# Patient Record
Sex: MALE | Race: WHITE | NOT HISPANIC OR LATINO
[De-identification: names, ages, dates, MRNs, and addresses within clinical notes are randomized per-mention and may not be internally consistent; named-entity substitution may affect disease eponyms.]

---

## 2017-03-06 ENCOUNTER — APPOINTMENT (OUTPATIENT)
Dept: OTOLARYNGOLOGY | Facility: CLINIC | Age: 22
End: 2017-03-06

## 2017-03-29 ENCOUNTER — APPOINTMENT (OUTPATIENT)
Dept: OTOLARYNGOLOGY | Facility: CLINIC | Age: 22
End: 2017-03-29

## 2017-03-29 VITALS
BODY MASS INDEX: 21.5 KG/M2 | WEIGHT: 137 LBS | SYSTOLIC BLOOD PRESSURE: 120 MMHG | HEIGHT: 67 IN | HEART RATE: 76 BPM | DIASTOLIC BLOOD PRESSURE: 77 MMHG

## 2017-03-29 DIAGNOSIS — H61.20 IMPACTED CERUMEN, UNSPECIFIED EAR: ICD-10-CM

## 2017-03-29 DIAGNOSIS — Z83.3 FAMILY HISTORY OF DIABETES MELLITUS: ICD-10-CM

## 2017-03-29 RX ORDER — METHYLPHENIDATE HYDROCHLORIDE 10 MG/1
10 TABLET ORAL
Qty: 90 | Refills: 0 | Status: ACTIVE | COMMUNITY
Start: 2017-01-10

## 2017-03-29 RX ORDER — FLUOXETINE HYDROCHLORIDE 20 MG/1
20 CAPSULE ORAL
Qty: 60 | Refills: 0 | Status: ACTIVE | COMMUNITY
Start: 2016-05-09

## 2017-05-24 ENCOUNTER — MEDICATION RENEWAL (OUTPATIENT)
Age: 22
End: 2017-05-24

## 2017-05-24 RX ORDER — ASPIRIN 81 MG
6.5 TABLET, DELAYED RELEASE (ENTERIC COATED) ORAL TWICE DAILY
Qty: 1 | Refills: 5 | Status: ACTIVE | COMMUNITY
Start: 2017-03-29 | End: 1900-01-01

## 2017-06-21 ENCOUNTER — APPOINTMENT (OUTPATIENT)
Dept: OTOLARYNGOLOGY | Facility: CLINIC | Age: 22
End: 2017-06-21

## 2017-10-02 ENCOUNTER — TRANSCRIPTION ENCOUNTER (OUTPATIENT)
Age: 22
End: 2017-10-02

## 2017-10-12 ENCOUNTER — APPOINTMENT (OUTPATIENT)
Dept: OTOLARYNGOLOGY | Facility: CLINIC | Age: 22
End: 2017-10-12
Payer: MEDICAID

## 2017-10-12 VITALS
HEIGHT: 67 IN | HEART RATE: 62 BPM | BODY MASS INDEX: 21.5 KG/M2 | WEIGHT: 137 LBS | DIASTOLIC BLOOD PRESSURE: 69 MMHG | SYSTOLIC BLOOD PRESSURE: 105 MMHG

## 2017-10-12 DIAGNOSIS — R04.0 EPISTAXIS: ICD-10-CM

## 2017-10-12 DIAGNOSIS — J34.2 DEVIATED NASAL SEPTUM: ICD-10-CM

## 2017-10-12 DIAGNOSIS — H61.23 IMPACTED CERUMEN, BILATERAL: ICD-10-CM

## 2017-10-12 DIAGNOSIS — S09.92XA UNSPECIFIED INJURY OF NOSE, INITIAL ENCOUNTER: ICD-10-CM

## 2017-10-12 DIAGNOSIS — H90.0 CONDUCTIVE HEARING LOSS, BILATERAL: ICD-10-CM

## 2017-10-12 PROCEDURE — 69210 REMOVE IMPACTED EAR WAX UNI: CPT

## 2017-10-12 PROCEDURE — 31231 NASAL ENDOSCOPY DX: CPT | Mod: 52

## 2017-10-12 PROCEDURE — 99214 OFFICE O/P EST MOD 30 MIN: CPT | Mod: 25

## 2022-06-01 ENCOUNTER — INPATIENT (INPATIENT)
Facility: HOSPITAL | Age: 27
LOS: 7 days | Discharge: HOME | End: 2022-06-09
Attending: PSYCHIATRY & NEUROLOGY | Admitting: STUDENT IN AN ORGANIZED HEALTH CARE EDUCATION/TRAINING PROGRAM
Payer: MEDICAID

## 2022-06-01 VITALS
DIASTOLIC BLOOD PRESSURE: 74 MMHG | OXYGEN SATURATION: 98 % | HEIGHT: 60 IN | HEART RATE: 75 BPM | TEMPERATURE: 98 F | SYSTOLIC BLOOD PRESSURE: 130 MMHG | RESPIRATION RATE: 18 BRPM

## 2022-06-01 DIAGNOSIS — F90.9 ATTENTION-DEFICIT HYPERACTIVITY DISORDER, UNSPECIFIED TYPE: ICD-10-CM

## 2022-06-01 DIAGNOSIS — F32.A DEPRESSION, UNSPECIFIED: ICD-10-CM

## 2022-06-01 LAB
ALBUMIN SERPL ELPH-MCNC: 5 G/DL — SIGNIFICANT CHANGE UP (ref 3.3–5)
ALP SERPL-CCNC: 115 U/L — SIGNIFICANT CHANGE UP (ref 40–120)
ALT FLD-CCNC: 19 U/L — SIGNIFICANT CHANGE UP (ref 4–41)
AMPHET UR-MCNC: NEGATIVE — SIGNIFICANT CHANGE UP
ANION GAP SERPL CALC-SCNC: 18 MMOL/L — HIGH (ref 7–14)
APAP SERPL-MCNC: <10 UG/ML — LOW (ref 15–25)
APPEARANCE UR: CLEAR — SIGNIFICANT CHANGE UP
AST SERPL-CCNC: 22 U/L — SIGNIFICANT CHANGE UP (ref 4–40)
BACTERIA # UR AUTO: NEGATIVE — SIGNIFICANT CHANGE UP
BARBITURATES UR SCN-MCNC: NEGATIVE — SIGNIFICANT CHANGE UP
BASE EXCESS BLDV CALC-SCNC: -1.2 MMOL/L — SIGNIFICANT CHANGE UP (ref -2–3)
BASOPHILS # BLD AUTO: 0.03 K/UL — SIGNIFICANT CHANGE UP (ref 0–0.2)
BASOPHILS NFR BLD AUTO: 0.3 % — SIGNIFICANT CHANGE UP (ref 0–2)
BENZODIAZ UR-MCNC: NEGATIVE — SIGNIFICANT CHANGE UP
BILIRUB SERPL-MCNC: 0.5 MG/DL — SIGNIFICANT CHANGE UP (ref 0.2–1.2)
BILIRUB UR-MCNC: ABNORMAL
BLOOD GAS VENOUS COMPREHENSIVE RESULT: SIGNIFICANT CHANGE UP
BUN SERPL-MCNC: 14 MG/DL — SIGNIFICANT CHANGE UP (ref 7–23)
CALCIUM SERPL-MCNC: 10 MG/DL — SIGNIFICANT CHANGE UP (ref 8.4–10.5)
CHLORIDE BLDV-SCNC: 101 MMOL/L — SIGNIFICANT CHANGE UP (ref 96–108)
CHLORIDE SERPL-SCNC: 100 MMOL/L — SIGNIFICANT CHANGE UP (ref 98–107)
CO2 BLDV-SCNC: 24.8 MMOL/L — SIGNIFICANT CHANGE UP (ref 22–26)
CO2 SERPL-SCNC: 19 MMOL/L — LOW (ref 22–31)
COCAINE METAB.OTHER UR-MCNC: NEGATIVE — SIGNIFICANT CHANGE UP
COLOR SPEC: YELLOW — SIGNIFICANT CHANGE UP
CREAT SERPL-MCNC: 0.9 MG/DL — SIGNIFICANT CHANGE UP (ref 0.5–1.3)
CREATININE URINE RESULT, DAU: 447 MG/DL — SIGNIFICANT CHANGE UP
DIFF PNL FLD: ABNORMAL
EGFR: 122 ML/MIN/1.73M2 — SIGNIFICANT CHANGE UP
EOSINOPHIL # BLD AUTO: 0.06 K/UL — SIGNIFICANT CHANGE UP (ref 0–0.5)
EOSINOPHIL NFR BLD AUTO: 0.6 % — SIGNIFICANT CHANGE UP (ref 0–6)
EPI CELLS # UR: 1 /HPF — SIGNIFICANT CHANGE UP (ref 0–5)
ETHANOL SERPL-MCNC: <10 MG/DL — SIGNIFICANT CHANGE UP
FLUAV AG NPH QL: SIGNIFICANT CHANGE UP
FLUBV AG NPH QL: SIGNIFICANT CHANGE UP
GAS PNL BLDV: 132 MMOL/L — LOW (ref 136–145)
GAS PNL BLDV: SIGNIFICANT CHANGE UP
GLUCOSE BLDV-MCNC: 98 MG/DL — SIGNIFICANT CHANGE UP (ref 70–99)
GLUCOSE SERPL-MCNC: 96 MG/DL — SIGNIFICANT CHANGE UP (ref 70–99)
GLUCOSE UR QL: NEGATIVE — SIGNIFICANT CHANGE UP
HCO3 BLDV-SCNC: 24 MMOL/L — SIGNIFICANT CHANGE UP (ref 22–29)
HCT VFR BLD CALC: 48.6 % — SIGNIFICANT CHANGE UP (ref 39–50)
HCT VFR BLDA CALC: 50 % — SIGNIFICANT CHANGE UP (ref 39–51)
HGB BLD CALC-MCNC: 16.8 G/DL — SIGNIFICANT CHANGE UP (ref 13–17)
HGB BLD-MCNC: 16.2 G/DL — SIGNIFICANT CHANGE UP (ref 13–17)
HYALINE CASTS # UR AUTO: 2 /LPF — SIGNIFICANT CHANGE UP (ref 0–7)
IANC: 6.37 K/UL — SIGNIFICANT CHANGE UP (ref 1.8–7.4)
IMM GRANULOCYTES NFR BLD AUTO: 0.2 % — SIGNIFICANT CHANGE UP (ref 0–1.5)
KETONES UR-MCNC: ABNORMAL
LACTATE BLDV-MCNC: 1.8 MMOL/L — SIGNIFICANT CHANGE UP (ref 0.5–2)
LEUKOCYTE ESTERASE UR-ACNC: NEGATIVE — SIGNIFICANT CHANGE UP
LYMPHOCYTES # BLD AUTO: 2.58 K/UL — SIGNIFICANT CHANGE UP (ref 1–3.3)
LYMPHOCYTES # BLD AUTO: 26.2 % — SIGNIFICANT CHANGE UP (ref 13–44)
MCHC RBC-ENTMCNC: 27.8 PG — SIGNIFICANT CHANGE UP (ref 27–34)
MCHC RBC-ENTMCNC: 33.3 GM/DL — SIGNIFICANT CHANGE UP (ref 32–36)
MCV RBC AUTO: 83.4 FL — SIGNIFICANT CHANGE UP (ref 80–100)
METHADONE UR-MCNC: NEGATIVE — SIGNIFICANT CHANGE UP
MONOCYTES # BLD AUTO: 0.77 K/UL — SIGNIFICANT CHANGE UP (ref 0–0.9)
MONOCYTES NFR BLD AUTO: 7.8 % — SIGNIFICANT CHANGE UP (ref 2–14)
NEUTROPHILS # BLD AUTO: 6.37 K/UL — SIGNIFICANT CHANGE UP (ref 1.8–7.4)
NEUTROPHILS NFR BLD AUTO: 64.9 % — SIGNIFICANT CHANGE UP (ref 43–77)
NITRITE UR-MCNC: NEGATIVE — SIGNIFICANT CHANGE UP
NRBC # BLD: 0 /100 WBCS — SIGNIFICANT CHANGE UP
NRBC # FLD: 0 K/UL — SIGNIFICANT CHANGE UP
OPIATES UR-MCNC: NEGATIVE — SIGNIFICANT CHANGE UP
OXYCODONE UR-MCNC: NEGATIVE — SIGNIFICANT CHANGE UP
PCO2 BLDV: 39 MMHG — LOW (ref 42–55)
PCP SPEC-MCNC: SIGNIFICANT CHANGE UP
PCP UR-MCNC: NEGATIVE — SIGNIFICANT CHANGE UP
PH BLDV: 7.39 — SIGNIFICANT CHANGE UP (ref 7.32–7.43)
PH UR: 5.5 — SIGNIFICANT CHANGE UP (ref 5–8)
PLATELET # BLD AUTO: 276 K/UL — SIGNIFICANT CHANGE UP (ref 150–400)
PO2 BLDV: 80 MMHG — SIGNIFICANT CHANGE UP
POTASSIUM BLDV-SCNC: 3.3 MMOL/L — LOW (ref 3.5–5.1)
POTASSIUM SERPL-MCNC: 3.4 MMOL/L — LOW (ref 3.5–5.3)
POTASSIUM SERPL-SCNC: 3.4 MMOL/L — LOW (ref 3.5–5.3)
PROT SERPL-MCNC: 7.3 G/DL — SIGNIFICANT CHANGE UP (ref 6–8.3)
PROT UR-MCNC: ABNORMAL
RBC # BLD: 5.83 M/UL — HIGH (ref 4.2–5.8)
RBC # FLD: 13.7 % — SIGNIFICANT CHANGE UP (ref 10.3–14.5)
RBC CASTS # UR COMP ASSIST: 3 /HPF — SIGNIFICANT CHANGE UP (ref 0–4)
RSV RNA NPH QL NAA+NON-PROBE: SIGNIFICANT CHANGE UP
SALICYLATES SERPL-MCNC: <0.3 MG/DL — LOW (ref 15–30)
SAO2 % BLDV: 96.3 % — SIGNIFICANT CHANGE UP
SARS-COV-2 RNA SPEC QL NAA+PROBE: SIGNIFICANT CHANGE UP
SODIUM SERPL-SCNC: 137 MMOL/L — SIGNIFICANT CHANGE UP (ref 135–145)
SP GR SPEC: 1.04 — SIGNIFICANT CHANGE UP (ref 1–1.05)
THC UR QL: POSITIVE
TOXICOLOGY SCREEN, DRUGS OF ABUSE, SERUM RESULT: SIGNIFICANT CHANGE UP
TSH SERPL-MCNC: 0.64 UIU/ML — SIGNIFICANT CHANGE UP (ref 0.27–4.2)
UROBILINOGEN FLD QL: ABNORMAL
WBC # BLD: 9.83 K/UL — SIGNIFICANT CHANGE UP (ref 3.8–10.5)
WBC # FLD AUTO: 9.83 K/UL — SIGNIFICANT CHANGE UP (ref 3.8–10.5)
WBC UR QL: 1 /HPF — SIGNIFICANT CHANGE UP (ref 0–5)

## 2022-06-01 PROCEDURE — 99285 EMERGENCY DEPT VISIT HI MDM: CPT | Mod: 25

## 2022-06-01 PROCEDURE — 93010 ELECTROCARDIOGRAM REPORT: CPT

## 2022-06-01 RX ORDER — POTASSIUM CHLORIDE 20 MEQ
40 PACKET (EA) ORAL ONCE
Refills: 0 | Status: COMPLETED | OUTPATIENT
Start: 2022-06-01 | End: 2022-06-01

## 2022-06-01 RX ORDER — QUETIAPINE FUMARATE 200 MG/1
25 TABLET, FILM COATED ORAL ONCE
Refills: 0 | Status: COMPLETED | OUTPATIENT
Start: 2022-06-01 | End: 2022-06-01

## 2022-06-01 RX ORDER — FLUOXETINE HCL 10 MG
60 CAPSULE ORAL DAILY
Refills: 0 | Status: DISCONTINUED | OUTPATIENT
Start: 2022-06-02 | End: 2022-06-02

## 2022-06-01 RX ADMIN — Medication 40 MILLIEQUIVALENT(S): at 21:07

## 2022-06-01 RX ADMIN — QUETIAPINE FUMARATE 25 MILLIGRAM(S): 200 TABLET, FILM COATED ORAL at 21:44

## 2022-06-01 NOTE — ED BEHAVIORAL HEALTH ASSESSMENT NOTE - CASE SUMMARY
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 Kurtz since 14yo, last hospitalized at [facility] 2/14/2012-2/21/2012, with one prior suicide attempt, no known hx of violence, no sig prior legal hx, uses cannabis occasionally, no hx of withdrawals/DTs, no sig past medical hx, BIB mother/aunt/uncle referred by patient's psychiatrist due to recent behavior concerning for suicide attempt.    Patient presents with several months of grieving his father's death and behavior that endangered his safety (driving in oncoming traffic, taking unprescribed medications.) Though he denied on interview that these behaviors were suicidal in nature, he shared to mother/police/outpatient psychiatrist prior to presentation in ED that he wanted to kill himself. His provided history was inconsistent and illogical. He demonstrates poor insight and poor judgment. Mother reports that they do not feel patient can return to home safely. Patient requires admission for his safety and stabilization.

## 2022-06-01 NOTE — ED BEHAVIORAL HEALTH ASSESSMENT NOTE - DETAILS
d/w mother and Dr. Kurtz Patient expressed on social media and to collateral that he was attempting to kill himself. left message for Dr Rosales of inpatient team

## 2022-06-01 NOTE — ED PROVIDER NOTE - CLINICAL SUMMARY MEDICAL DECISION MAKING FREE TEXT BOX
This is a 26 yr old M, pmh unknown with c/o si attempt, possible od unknown substance. " 4 pills"  He became agitated at triage area wanted to leave. They brought him to , oppositional, angry , does not want to engage in assessment. Staff member was able to redirect him, and he changed.   As per triage rn pt tried to drive into upcoming traffic, after that went home, took 4 unknown pills, and posted on social media " easy ways to commit suicide"  PO fluid hydration , labs, venous blood gas,   psych consult

## 2022-06-01 NOTE — ED BEHAVIORAL HEALTH ASSESSMENT NOTE - SUMMARY
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 Kurtz since 14yo, last hospitalized at [facility] 2/14/2012-2/21/2012, with one prior suicide attempt, no known hx of violence, no sig prior legal hx, uses cannabis occasionally, no hx of withdrawals/DTs, no sig past medical hx, BIB mother/aunt/uncle referred by patient's psychiatrist due to recent behavior concerning for suicide attempt.    Patient presents with several months of grieving his father's death and behavior that endangered his safety (driving in oncoming traffic, taking unprescribed medications.) Though he denied on interview that these behaviors were suicidal in nature, he shared to mother/police/outpatient psychiatrist prior to presentation in ED that he wanted to kill himself. His provided history was inconsistent and illogical. He demonstrates poor insight and poor judgment. Mother reports that they do not feel patient can return to home safely. Patient requires admission for his safety and stabilization.

## 2022-06-01 NOTE — ED BEHAVIORAL HEALTH ASSESSMENT NOTE - DESCRIPTION
Parents are , patient lives with mother. Father  unexpectedly in Dec 2021 at 60yo; patient was close with father. Grandfather ldied in 2021 at 91yo. 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 ICU Vital Signs Last 24 Hrs  T(C): 36.9 (01 Jun 2022 16:56), Max: 36.9 (01 Jun 2022 16:56)  T(F): 98.5 (01 Jun 2022 16:56), Max: 98.5 (01 Jun 2022 16:56)  HR: 75 (01 Jun 2022 16:56) (75 - 75)  BP: 130/74 (01 Jun 2022 16:56) (130/74 - 130/74)  BP(mean): --  ABP: --  ABP(mean): --  RR: 18 (01 Jun 2022 16:56) (18 - 18)  SpO2: 98% (01 Jun 2022 16:56) (98% - 98%) none

## 2022-06-01 NOTE — ED ADULT NURSE NOTE - OBJECTIVE STATEMENT
[facility]ac[facility] RN note- patient arrives to the PSYCH ER after taking four tablets of medication and posting on social media a screen shot searching on [employer] of "easy ways to commit suicide". Per triage note patient reports he tried to turn onto oncoming traffic while driving but patient reports he made that up for attention. Patient denies any HI/SI/AH/VH. Patient cooperative. Labs drawn as ordered. COVID swab sent. Safety maintained. EKG obtained. Patient stable upon exiting the room.

## 2022-06-01 NOTE — ED BEHAVIORAL HEALTH NOTE - BEHAVIORAL HEALTH NOTE
As per request of provider, writer contacted patient’s mother nisha ([phone]) to obtain collateral information. mother requested a call back in 5-10 minutes due to sharing information with registration for the patient. As per request of provider, writer contacted patient’s mother nisha ([phone]) to obtain collateral information. mother requested a call back in 5-10 minutes due to sharing information with registration for the patient. As per request of provider, writer contacted patient’s mother Jeff ([phone]) to obtain collateral information.  The following information is per patient’s mother Jeff.    Patient is a 25 YO male domiciled with mother. Patient BIB mother, aunt and uncle today due two suicide attempts yesterday. As per mother, patient came home with several tickets for driving down on the wrong side of the street in an attempt to hurt himself and also reports to the family that he swallowed 4-7 pills that were at his house from his ex-girlfriend (antibiotics and muscle relaxers). Mother reports patient refused to come to the ED until today. Mother called mobile crisis and they were scheduled to come today as well. Mother has been in touch with patient’s psychiatrist Dr. Kurtz since yesterday. Mother says while the patient was outside, he became resistant and attempted to elope from the hospital. Mother says the patient has a hx of ADHD and depression. Patient is prescribed Ritalin 2x a day 10 mg. Seroquel 50 mg 1x a day and Prozac (doesn’t take as prescribed) 20 mg 4 caps at bedtime. Mother reports patient has appeared more depressed since Saturday. Patient has been sleeping excessively, is lethargic, not eating and hygiene is poor. Mother says the patient works as a  but called out of work since yesterday. Mother reports patient’s father passed away unexpectedly in December 2021. Patient’s grandfather passed away 10 months ago and the grandmother has been in a nursing home for the past 10 weeks.  NO medical problems reported, and patient is covid vaccinated/boosted with booster in 2021. Patient has one prior psych admission for ADHD when he was 13 YO. Mother reports one prior suicide attempt but says it was years ago and could not recall details. Mother believes patient needs psychiatric admission and does not feel safe with patient returning home. Writer agreed to keep the mother updated. As per request of provider, writer contacted patient’s mother Jeff ([phone]) to obtain collateral information.  The following information is per patient’s mother Jeff.    Patient is a 25 YO male domiciled with mother. Patient BIB mother, aunt and uncle today due two suicide attempts yesterday. As per mother, patient came home with several tickets for driving down on the wrong side of the street in an attempt to hurt himself and also reports to the family that he swallowed 4-7 pills that were at his house from his ex-girlfriend (antibiotics and muscle relaxers). Mother reports patient refused to come to the ED until today. Mother called mobile crisis and they were scheduled to come today as well. Mother has been in touch with patient’s psychiatrist Dr. Kurtz since yesterday. Mother says while the patient was outside, he became resistant and attempted to elope from the hospital. Mother says the patient has a hx of ADHD and depression. Patient is prescribed Ritalin 2x a day 10 mg. Seroquel 50 mg 1x a day and Prozac (doesn’t take as prescribed) 20 mg 4 caps at bedtime. Mother reports patient has appeared more depressed since Saturday. Patient has been sleeping excessively, is lethargic, not eating and hygiene is poor. Mother says the patient works as a  but called out of work since yesterday. Mother reports patient’s father passed away unexpectedly in December 2021. Patient’s grandfather passed away 10 months ago and the grandmother has been in a nursing home for the past 10 weeks. MOther reports patient smokes marijuana socially and drinks on occasion.  NO medical problems reported, and patient is covid vaccinated/boosted with booster in 2021. Patient has one prior psych admission for ADHD when he was 15 YO. Mother reports one prior suicide attempt but says it was years ago and could not recall details. Mother believes patient needs psychiatric admission and does not feel safe with patient returning home. Writer agreed to keep the mother updated.

## 2022-06-01 NOTE — ED PROVIDER NOTE - PROGRESS NOTE DETAILS
BLACK Romeo- upon reassessment , pt endorses he takes prozac, seroquel and ritalin. He states couople of days ago he took extra ritalin because wanted to focus better. He states he just lied he did not want to kill himself,  he told that because he wanted more attention.  Yes he drove into upcoming traffic but to go around the cars, not with the intention to die. He currently has one more class and will became and . 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 Sign out follow-up: No acute overnight events. Pt medically clear and boarding for involuntary admission for suicide attempt. LUIS

## 2022-06-01 NOTE — ED BEHAVIORAL HEALTH NOTE - BEHAVIORAL HEALTH NOTE
COVID Exposure Screen- collateral (i.e. third-party, chart review, belongings, etc; include EMS and ED staff)  1.	*Has the patient had a COVID-19 test in the last 90 days?  (  ) Yes   (x  ) No   (  ) Unknown- Reason: _____  IF YES PROCEED TO QUESTION #2. IF NO OR UNKNOWN, PLEASE SKIP TO QUESTION #3.  2.	Date of test(s) and result(s): ________  3.	*Has the patient tested positive for COVID-19 antibodies? (  ) Yes   (  ) No   ( x ) Unknown- Reason: _____  IF YES PROCEED TO QUESTION #4. IF NO or UNKNOWN, PLEASE SKIP TO QUESTION #5.  4.	Date of positive antibody test: ________  5.	*Has the patient received 2 doses of the COVID-19 vaccine? (x  ) Yes   (  ) No   (  ) Unknown- Reason: _____  IF YES PROCEED TO QUESTION #6. IF NO or UNKNOWN, PLEASE SKIP TO QUESTION #7.  6.	 Date of second dose: ________  7.	*In the past 10 days, has the patient been around anyone with a positive COVID-19 test?* (  ) Yes   ( x ) No   (  ) Unknown- Reason: __  IF YES PROCEED TO QUESTION #8. IF NO or UNKNOWN, PLEASE SKIP TO QUESTION #13.  8.	Was the patient within 6 feet of them for at least 15 minutes? (  ) Yes   (  ) No   (  ) Unknown- Reason: _____  9.	Did the patient provide care for them? (  ) Yes   (  ) No   (  ) Unknown- Reason: ______  10.	Did the patient have direct physical contact with them (touched, hugged, or kissed them)? (  ) Yes   (  ) No    (  ) Unknown- Reason: __  11.	Did the patient share eating or drinking utensils with them? (  ) Yes   (  ) No    (  ) Unknown- Reason: ____  12.	Did they sneeze, cough, or somehow get respiratory droplets on the patient? (  ) Yes   (  ) No    (  ) Unknown- Reason: ______  13.	*Has the patient been out of [place] within the past 10 days?* (  ) Yes   ( x ) No   (  ) Unknown- Reason: _____  IF YES PLEASE ANSWER THE FOLLOWING QUESTIONS:  14.	Which state/country did they go to? ______  15.	Were they there over 24 hours? (  ) Yes   (  ) No    (  ) Unknown- Reason: ______  16.	Date of return to [facility]: ______

## 2022-06-01 NOTE — ED BEHAVIORAL HEALTH ASSESSMENT NOTE - HPI (INCLUDE ILLNESS QUALITY, SEVERITY, DURATION, TIMING, CONTEXT, MODIFYING FACTORS, ASSOCIATED SIGNS AND SYMPTOMS)
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 Kurtz since 14yo, last hospitalized at [facility] 2012-2012, with one prior suicide attempt, no known hx of violence, no sig prior legal hx, uses cannabis occasionally, no hx of withdrawals/DTs, no sig past medical hx, BIB mother/aunt/uncle referred by patient's psychiatrist due to recent behavior concerning for suicide attempt.    On interview, patient was lying prone on the hospital bed. He was anxious and frequently asked, "Can I leave? They told me I could leave because I came voluntarily." He states, "I am perfectly fine... I'm not going to kill myself." He explains that he was driving to the [place] yesterday and drove into the opposite lanes to avoid traffic. He was stopped by police and given 7 tickets due to multiple violations (driving through a stop sign, driving with suspended license, driving in oncoming traffic, etc). He told the police he was suicidal "so that they wouldn't think I was just driving crazy." When asked what he expected the police's response to be, he declined to answer. He says he also told mother that he was trying to kill himself so that she wouldn't think he was being reckless. When asked if he took any unprescribed medications, he denied it. When told that mother had mentioned him taking his ex-girlfriend's pills, he then stated that he took his ritalin "to focus on reading a sports article" and advil for a headache. Pt states that he sometimes feels sad when talking about his recently  father. There is a sports event coming up in  that he annually attends with his father, which he has been thinking about recently. Otherwise, he denies other ROS and states that he is "perfectly fine". He denies si/hi/i/p or NSSIB, states that the last time he had si was one year ago and defers providing details. He uses cannabis occasionally and denies use of other substances.    See  note for collateral from patient's mother.    Per patient's outpatient psychiatrist, Dr. Kurtz: Patient's mother called Dr. Kurtz yesterday evening with concerns of patient attempting suicide. Dr. Kurtz recommended calling 911 for ED evaluation. Dr. Kurtz is in agreement with need for psychiatric admission.

## 2022-06-01 NOTE — ED PROVIDER NOTE - OBJECTIVE STATEMENT
This is a 26 yr old M, pmh unknown with c/o si attempt, possible od unknown substance. " 4 pills"  He became agitated at triage area wanted to leave. They brought him to , oppositional, angry , does not want to engage in assessment. Staff member was able to redirect him, and he changed.   As per triage rn pt tried to drive into upcoming traffic, after that went home, took 4 unknown pills, and posted on social media " easy ways to commit suicide"

## 2022-06-01 NOTE — ED BEHAVIORAL HEALTH ASSESSMENT NOTE - RISK ASSESSMENT
High Acute Suicide Risk Patient has elevated chronic risk of harm to self due to previous suicide attempt, prior hospitalization, history of depression. His acute risks include recent suicide attempt, grieving recent loss of his father, poor insight, poor judgment. His protective factors are limited (supportive mother, stable living situation, enrolled in vocational school, engaged in outpatient treatment) and cannot safely mitigate his risks.

## 2022-06-01 NOTE — ED ADULT TRIAGE NOTE - CHIEF COMPLAINT QUOTE
went home took 4 tablets of unknown medication for which does not have prescription for and that appeared old   posted on face book screen shot for google search for "easy ways to commit suicide " nisha mother

## 2022-06-01 NOTE — ED BEHAVIORAL HEALTH ASSESSMENT NOTE - ADDITIONAL DETAILS ALL
Patient denies SI on interview but expressed on social media and to collateral yesterday that he was attempting to kill himself.

## 2022-06-01 NOTE — ED ADULT NURSE NOTE - NSIMPLEMENTINTERV_GEN_ALL_ED
Implemented All Fall Risk Interventions:  [place] to call system. Call bell, personal items and telephone within reach. Instruct patient to call for assistance. Room bathroom lighting operational. Non-slip footwear when patient is off stretcher. Physically safe environment: no spills, clutter or unnecessary equipment. Stretcher in lowest position, wheels locked, appropriate side rails in place. Provide visual cue, wrist band, yellow gown, etc. Monitor gait and stability. Monitor for mental status changes and reorient to person, place, and time. Review medications for side effects contributing to fall risk. Reinforce activity limits and safety measures with patient and family.

## 2022-06-01 NOTE — ED BEHAVIORAL HEALTH ASSESSMENT NOTE - PSYCHIATRIC ISSUES AND PLAN (INCLUDE STANDING AND PRN MEDICATION)
prozac 60mg qD, seroquel 25mg hs, hold Ritalin 10mg bid as patient will not be working/studying while hospitalized; PRN ativan 2mg PO q6h for anxiety/agitation; PRN haldol 5mg PO q6h and benadryl 50mg PO q6h for agitation; PRN ativan 2mg IM and haldol 5mg IM and benadryl 50mg IM q6h for severe agitation

## 2022-06-02 DIAGNOSIS — F33.9 MAJOR DEPRESSIVE DISORDER, RECURRENT, UNSPECIFIED: ICD-10-CM

## 2022-06-02 LAB
COVID-19 SPIKE DOMAIN AB INTERP: POSITIVE
COVID-19 SPIKE DOMAIN ANTIBODY RESULT: >250 U/ML — HIGH
SARS-COV-2 IGG+IGM SERPL QL IA: >250 U/ML — HIGH
SARS-COV-2 IGG+IGM SERPL QL IA: POSITIVE

## 2022-06-02 RX ORDER — HYDROXYZINE HCL 10 MG
25 TABLET ORAL EVERY 6 HOURS
Refills: 0 | Status: DISCONTINUED | OUTPATIENT
Start: 2022-06-02 | End: 2022-06-09

## 2022-06-02 RX ORDER — QUETIAPINE FUMARATE 200 MG/1
25 TABLET, FILM COATED ORAL AT BEDTIME
Refills: 0 | Status: DISCONTINUED | OUTPATIENT
Start: 2022-06-02 | End: 2022-06-09

## 2022-06-02 RX ORDER — HALOPERIDOL DECANOATE 100 MG/ML
5 INJECTION INTRAMUSCULAR EVERY 6 HOURS
Refills: 0 | Status: DISCONTINUED | OUTPATIENT
Start: 2022-06-02 | End: 2022-06-09

## 2022-06-02 RX ORDER — LANOLIN ALCOHOL/MO/W.PET/CERES
3 CREAM (GRAM) TOPICAL AT BEDTIME
Refills: 0 | Status: DISCONTINUED | OUTPATIENT
Start: 2022-06-02 | End: 2022-06-09

## 2022-06-02 RX ORDER — DIPHENHYDRAMINE HCL 50 MG
50 CAPSULE ORAL EVERY 6 HOURS
Refills: 0 | Status: DISCONTINUED | OUTPATIENT
Start: 2022-06-02 | End: 2022-06-09

## 2022-06-02 RX ORDER — FLUOXETINE HCL 10 MG
60 CAPSULE ORAL DAILY
Refills: 0 | Status: DISCONTINUED | OUTPATIENT
Start: 2022-06-02 | End: 2022-06-02

## 2022-06-02 RX ORDER — DIPHENHYDRAMINE HCL 50 MG
50 CAPSULE ORAL ONCE
Refills: 0 | Status: DISCONTINUED | OUTPATIENT
Start: 2022-06-02 | End: 2022-06-09

## 2022-06-02 RX ORDER — FLUOXETINE HCL 10 MG
60 CAPSULE ORAL AT BEDTIME
Refills: 0 | Status: DISCONTINUED | OUTPATIENT
Start: 2022-06-02 | End: 2022-06-09

## 2022-06-02 RX ORDER — HALOPERIDOL DECANOATE 100 MG/ML
5 INJECTION INTRAMUSCULAR ONCE
Refills: 0 | Status: DISCONTINUED | OUTPATIENT
Start: 2022-06-02 | End: 2022-06-09

## 2022-06-02 RX ADMIN — QUETIAPINE FUMARATE 25 MILLIGRAM(S): 200 TABLET, FILM COATED ORAL at 20:17

## 2022-06-02 RX ADMIN — Medication 60 MILLIGRAM(S): at 20:17

## 2022-06-02 RX ADMIN — Medication 3 MILLIGRAM(S): at 20:17

## 2022-06-02 NOTE — BH CONSULTATION LIAISON PROGRESS NOTE - NSBHFUPINTERVALHXFT_PSY_A_CORE
Patient slept most of the night. He was given Seroquel 25mg po qhs with positive effect.   He states that he was brought to the hospital because someone said he tried to hurt himself but he denies that was the case. He's denying that he's feeling suicidal at this time.   Pt informed that he will be admitted involuntarily to a psychiatric hospital.

## 2022-06-02 NOTE — ED ADULT NURSE REASSESSMENT NOTE - NS ED NURSE REASSESS COMMENT FT1
Break Covering RN: Pt sleeping in bed at this time. Respirations even and unlabored, no accessory muscle use. NAD noted. Pt boarding and awaiting available psychiatric bed.

## 2022-06-02 NOTE — BH CONSULTATION LIAISON PROGRESS NOTE - NSBHCHARTREVIEWVS_PSY_A_CORE FT
Vital Signs Last 24 Hrs  T(C): 36.9 (02 Jun 2022 07:33), Max: 37 (02 Jun 2022 03:12)  T(F): 98.5 (02 Jun 2022 07:33), Max: 98.6 (02 Jun 2022 03:12)  HR: 65 (02 Jun 2022 07:33) (65 - 75)  BP: 124/78 (02 Jun 2022 07:33) (120/79 - 130/74)  BP(mean): --  RR: 16 (02 Jun 2022 07:33) (16 - 18)  SpO2: 100% (02 Jun 2022 07:33) (98% - 100%)

## 2022-06-02 NOTE — BH PATIENT PROFILE - FALL HARM RISK - UNIVERSAL INTERVENTIONS
Non-slip footwear when patient is out of bed/[place] to call system/Physically safe environment - no spills, clutter or unnecessary equipment/Purposeful Proactive Rounding/Room/bathroom lighting operational, light cord in reach

## 2022-06-02 NOTE — BH CONSULTATION LIAISON PROGRESS NOTE - NSBHASSESSMENTFT_PSY_ALL_CORE
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 Kurtz since 14yo, last hospitalized at [facility] 2/14/2012-2/21/2012, with one prior suicide attempt, no known hx of violence, no sig prior legal hx, uses cannabis occasionally, no hx of withdrawals/DTs, no sig past medical hx, BIB mother/aunt/uncle referred by patient's psychiatrist due to recent behavior concerning for suicide attempt.    Patient presents with several months of grieving his father's death and behavior that endangered his safety (driving in oncoming traffic, taking unprescribed medications.) Though he denied on interview that these behaviors were suicidal in nature, he shared to mother/police/outpatient psychiatrist prior to presentation in ED that he wanted to kill himself. His provided history was inconsistent and illogical. He demonstrates poor insight and poor judgment. Mother reports that they do not feel patient can return to home safely. Patient requires admission for his safety and stabilization.

## 2022-06-02 NOTE — BH CONSULTATION LIAISON PROGRESS NOTE - NSBHCONSULTPSYCHPLAN_PSY_A_CORE FT
c/w Prozac 60mg  Consider increasing Seroquel to 50mg po qhs  PRNS: ATarax 25mg po q6hrs PRN  ativan 2mg po/im q6hrs, benadryl 50mg po/im q6hrs, haldol 5mg po/im q6hrs PRN for agitation

## 2022-06-03 LAB
A1C WITH ESTIMATED AVERAGE GLUCOSE RESULT: 5.1 % — SIGNIFICANT CHANGE UP (ref 4–5.6)
CHOLEST SERPL-MCNC: 179 MG/DL — SIGNIFICANT CHANGE UP
ESTIMATED AVERAGE GLUCOSE: 100 — SIGNIFICANT CHANGE UP
HDLC SERPL-MCNC: 29 MG/DL — LOW
LIPID PNL WITH DIRECT LDL SERPL: 126 MG/DL — HIGH
NON HDL CHOLESTEROL: 150 MG/DL — HIGH
TRIGL SERPL-MCNC: 119 MG/DL — SIGNIFICANT CHANGE UP

## 2022-06-03 PROCEDURE — 99222 1ST HOSP IP/OBS MODERATE 55: CPT

## 2022-06-03 RX ORDER — METHYLPHENIDATE HCL 5 MG
10 TABLET ORAL
Refills: 0 | Status: DISCONTINUED | OUTPATIENT
Start: 2022-06-03 | End: 2022-06-09

## 2022-06-03 RX ORDER — METHYLPHENIDATE HCL 5 MG
10 TABLET ORAL
Refills: 0 | Status: DISCONTINUED | OUTPATIENT
Start: 2022-06-03 | End: 2022-06-03

## 2022-06-03 RX ADMIN — QUETIAPINE FUMARATE 25 MILLIGRAM(S): 200 TABLET, FILM COATED ORAL at 21:07

## 2022-06-03 RX ADMIN — Medication 3 MILLIGRAM(S): at 23:03

## 2022-06-03 RX ADMIN — Medication 60 MILLIGRAM(S): at 21:07

## 2022-06-03 NOTE — BH INPATIENT PSYCHIATRY ASSESSMENT NOTE - RISK ASSESSMENT
Risk factors:   Modifiable: active substance abuse, unable to care for self 2/2 psychiatric illness  Nonmodifiable: h/o SA/SIB, h/o psych admissions    Protective factors: no current SIIP/HIIP, no access to weapons, good physical health, engaged in work, domiciled, social supports, engaged in treatment, compliant with treatment    Overall, pt is at moderate risk of harm and meets criteria for psychiatric admission. Risk factors:   Modifiable: active substance abuse, unable to care for self 2/2 psychiatric illness, report of depressive symptoms and S/I  Nonmodifiable: h/o SA/SIB, h/o psych admissions    Protective factors: no current SIIP/HIIP, no access to weapons, good physical health, engaged in work, domiciled, social supports, engaged in treatment, compliant with treatment    Overall, pt is at moderate risk of harm and meets criteria for psychiatric admission.

## 2022-06-03 NOTE — PSYCHIATRIC REHAB INITIAL EVALUATION - NSBHPRRECOMMEND_PSY_ALL_CORE
Pt is a 27 y/o male. Pt reported one prior psychiatric admission when he was in 9th grade. Pt is currently in treatment with Dr. Kurtz. Pt denies hx of SA. Pt was admitted to [address] due to Suicide Attempt. Pt reported he made a statement to his mother that he wanted to crash his car because he was annoyed there were so many cars in front of him and he drove another ranjan to avoid traffic. As per chart, pt was driving to [employer] yesterday and drove into the opposite lanes to avoid traffic, and he was stopped by police and given 7 tickets due to multiple violation. Pt endorsed anxiety sometimes. Pt currently denies other symptoms. Pt stated he is currently working at [employer] for the past 3-4 months as an . Pt admitted he has been smoking marijuana few times a week for the past few years and his last use was on Saturday or Sunday.     Writer met with pt for initial assessment, introduced self and on-site psychiatric rehabilitation staff. Writer has oriented psychiatric rehabilitation department function and services. Writer also provided a copy of welcome letter and the unit schedule. Writer has reviewed unit programming and structure activities with pt. Pt was receptive.   During this assessment, pt is cooperative, and verbal.

## 2022-06-03 NOTE — BH INPATIENT PSYCHIATRY ASSESSMENT NOTE - NSBHCHARTREVIEWVS_PSY_A_CORE FT
Vital Signs Last 24 Hrs  T(C): 36.7 (06-03-22 @ 08:08), Max: 36.7 (06-03-22 @ 08:08)  T(F): 98 (06-03-22 @ 08:08), Max: 98 (06-03-22 @ 08:08)  HR: 72 (06-03-22 @ 08:08) (72 - 72)  BP: 117/64 (06-03-22 @ 08:08) (117/64 - 117/64)  BP(mean): --  RR: --  SpO2: --

## 2022-06-03 NOTE — BH INPATIENT PSYCHIATRY ASSESSMENT NOTE - MSE UNSTRUCTURED FT
The patient appears stated age, fair hygiene and dressed appropriately.  The patient was calm, cooperative with the interview and maintained poor eye contact with fair relatedness. No psychomotor agitation or retardation noted, no abnormal movements. Steady gait observed. The patient's speech was fluent, normal in tone, rate, but low in volume. The patient's mood is "good." Affect is constricted. Thought process is linear, fair associations. Thought content negative for delusions, AVH, paranoia, referential thinking, SI/HI. Cognition AAOx3. Insight is poor.  Judgment is poor.  Impulse control has been fair on the unit.

## 2022-06-03 NOTE — BH INPATIENT PSYCHIATRY ASSESSMENT NOTE - DESCRIPTION
Parents are , patient lives with mother. Father  unexpectedly in Dec 2021 at 60yo; patient was close with father. Grandfather  in 2021 at 93yo. 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

## 2022-06-03 NOTE — BH SOCIAL WORK INITIAL PSYCHOSOCIAL EVALUATION - OTHER PAST PSYCHIATRIC HISTORY (INCLUDE DETAILS REGARDING ONSET, COURSE OF ILLNESS, INPATIENT/OUTPATIENT TREATMENT)
Pt. is a 27 y/o male with history of depression, ADHD with prior psych. hospitalizations last hospitalized at [facility] 2012-2012 with one prior suicide attempt.  Pt. was bib family by pt's psychiatrist, Dr. Kurtz due to recent behavior concerning suicide attempt.  Pt. explained that he was driving to the pizza place and drove in the opposite lanes to avoid traffic and was stopped by police, given 7 tickets.  He told police he was suicidal "so they wouldn't think I was just driving crazy" . Pt. stated that he sometimes feels sad about his recently  father and there is a sporting event in  that he annually attends with his father.

## 2022-06-03 NOTE — BH TREATMENT PLAN - NSTXSUICIDINTERRN_PSY_ALL_CORE
Assess pt for S/I/I/P and SIB, explore healthy coping skills and protective factors, provide support, maintain safety, encourage pt to participate in groups and unit activities, administer and educate on ordered medications

## 2022-06-03 NOTE — BH INPATIENT PSYCHIATRY ASSESSMENT NOTE - HPI (INCLUDE ILLNESS QUALITY, SEVERITY, DURATION, TIMING, CONTEXT, MODIFYING FACTORS, ASSOCIATED SIGNS AND SYMPTOMS)
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 Kurtz since 12yo, last hospitalized at [facility] in 2012, with one prior suicide attempt, no known hx of violence, history of arrest for marijuana possession, uses cannabis occasionally, no hx of withdrawals/DTs, no sig past medical hx, BIB mother/aunt/uncle referred by patient's psychiatrist due to recent behavior concerning for suicide attempt. Patient hospitalized to [facility] 1S for further stabilization.     Upon assessment, patient calm and cooperative. He states that he is doing fine, but sometimes feels depressed, especially since the death of his grandfather last year, his dog 3 years ago, and most recently the death of his father in December 2021. He denies having any major changes in sleep pattern, recent appetite/weight changes, anhedonia, guilt, hopelessness, irritability. Patient states that he was brought to the hospital after driving on the wrong side of the road, and telling family that he was trying to kill himself. He said he did this in order to avoid getting in trouble for driving on the wrong side of the road, says he truly did it to avoid traffic. He states he did tell family that he took some of his ex girlfriend's pills, but denies actually doing so, says he doesn't know why he said it. He denies any recent SI, but admits to SI in past when he was 15 years old, which is when he was hospitalized. He says he had SI then because his father didn't let him meet his favorite football player. Patient states that he is doing well on Ritalin, Seroquel, and Prozac which help with his concentration from ADHD, and mood respectively. He denies AVH, delusions, HI/HI, history of manic symptoms of period of several days with poor sleep or feeling irritable or elated. Patient denies recent change in drug use, says he smokes cannabis 3-4x weekly, drinks 2 beers and 2 jacks and cokes every few months.      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 Kurtz since 14yo, last hospitalized at [facility] in 2012, with one prior suicide attempt, no known hx of violence, history of arrest for marijuana possession, uses cannabis occasionally, no hx of withdrawals/DTs, no sig past medical hx, BIB mother/aunt/uncle referred by patient's psychiatrist due to recent behavior concerning for suicide attempt. Patient hospitalized to [facility] 1S for further stabilization.     Upon assessment, patient calm and cooperative. He states that he is doing fine, but sometimes feels depressed, especially since the death of his grandfather last year, his dog 3 years ago, and most recently the death of his father in December 2021. He denies having any major changes in sleep pattern, recent appetite/weight changes, anhedonia, guilt, hopelessness, irritability. Patient states that he was brought to the hospital after driving on the wrong side of the road, and telling family that he was trying to kill himself. He said he did this in order to avoid getting in trouble for driving on the wrong side of the road, says he truly did it to avoid traffic. He states he did tell family that he took some of his ex girlfriend's pills, but denies actually doing so, says he doesn't know why he said it. He denies any recent SI, but admits to SI in past when he was 15 years old, which is when he was hospitalized. He says he had SI then because his father didn't let him meet his favorite football player. Patient states that he is doing well on Ritalin, Seroquel, and Prozac which help with his concentration from ADHD, and mood respectively. He denies AVH, delusions, HI/HI, history of manic symptoms of period of several days with poor sleep or feeling irritable or elated. Patient denies recent change in drug use, says he smokes cannabis 3-4x weekly, drinks 2 beers and 2 jacks and cokes every few months.     Confirmed Ritalin prescription last 04/06 10mg 60 tabs, 30 day supply. Reference #: [id number]

## 2022-06-03 NOTE — PSYCHIATRIC REHAB INITIAL EVALUATION - VISION (WITH CORRECTIVE LENSES IF THE PATIENT USUALLY WEARS THEM):
Chart reviewed and attempted to see patient for OT intervention. Despite encouragement and education on the importance of functional activity, patient adamantly declined OOB activity today. OT instructed patient on bed exercises to include scapular elevation, shoulder flexion, and bilateral leg raises (patient performed 1 set x 10 reps of each). Will follow up for OT intervention tomorrow as able. Thank you. Normal vision: sees adequately in most situations; can see medication labels, newsprint

## 2022-06-03 NOTE — BH INPATIENT PSYCHIATRY ASSESSMENT NOTE - NSBHASSESSSUMMFT_PSY_ALL_CORE
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 Kurtz since 14yo, last hospitalized at [facility] in 2012, with one prior suicide attempt, no known hx of violence, history of arrest for marijuana possession, uses cannabis occasionally, no hx of withdrawals/DTs, no sig past medical hx, BIB mother/aunt/uncle referred by patient's psychiatrist due to recent behavior concerning for suicide attempt. Patient hospitalized to [facility] 1S for further stabilization.     DDx: Major Depressive Disorder    Today, patient presented with constricted affect, though minimizing/denying depressive symptoms reported from collateral, including poor ADLs, poor sleep, excessive sleeping and lethargy. Patient currently denying SI. Per collateral, SI comments may have been from impulsiveness and poor judgement, not true suicidal ideation. Will continue Prozac and Seroquel for antidepressive effects, Ritalin for ADHD.    Plan:  1.	Legal: continue 9.39 status  2.	Safety: routine obs appropriate as pt denying active SI/HI; Haldol/Ativan/Benadryl PRN agitation  3.	Psychiatric: Ritalin 10mg BID, Seroquel 25mg qhs, Prozac 60mg qhs with plan to titrate to 80mg  4.	Group/Milieu therapy   5.	Medical: no acute issues  6.	Collateral/Dispo: See  chart note for collateral from mother and outpatient psychiatrist after receiving verbal consent. Dispo upon resolution of depressive symptoms.   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 Kurtz since 12yo, last hospitalized at [facility] in 2012, with one prior suicide attempt, no known hx of violence, history of arrest for marijuana possession, uses cannabis occasionally, no hx of withdrawals/DTs, no sig past medical hx, BIB mother/aunt/uncle referred by patient's psychiatrist due to recent behavior concerning for suicide attempt. Patient hospitalized to [facility] 1S for further stabilization.     DDx: Major Depressive Disorder    Today, patient presented with constricted affect, though minimizing/denying depressive symptoms reported from collateral, including poor ADLs, poor sleep, excessive sleeping and lethargy. Patient currently denying SI. Per collateral, SI comments may have been from impulsiveness and poor judgement, not true suicidal ideation. Will continue Prozac and Seroquel for antidepressive effects, Ritalin for ADHD. Continue to assess SI and monitor for safety.    Plan:  1.	Legal: continue 9.39 status  2.	Safety: routine obs appropriate as pt denying active SI/HI; Haldol/Ativan/Benadryl PRN agitation  3.	Psychiatric:   -continue Ritalin 10mg BID for ADHD  -continue Seroquel 25mg qhs for sleep/mood  -continue Prozac 60mg qhs with plan to discuss titration to 80mg with pt/family  4.	Individual/Group/Milieu therapy   5.	Medical: no acute issues  6.	Collateral/Dispo: See  chart note for collateral from mother and outpatient psychiatrist after receiving verbal consent. Dispo upon improvement of depressive symptoms and safety

## 2022-06-03 NOTE — BH INPATIENT PSYCHIATRY ASSESSMENT NOTE - NSBHCHARTREVIEWLAB_PSY_A_CORE FT
16.2   9.83  )-----------( 276      ( 2022 17:42 )             48.6       06-01    137  |  100  |  14  ----------------------------<  96  3.4<L>   |  19<L>  |  0.90    Ca    10.0      2022 18:03    TPro  7.3  /  Alb  5.0  /  TBili  0.5  /  DBili  x   /  AST  22  /  ALT  19  /  AlkPhos  115  06-01              Urinalysis Basic - ( 2022 17:57 )    Color: Yellow / Appearance: Clear / S.036 / pH: x  Gluc: x / Ketone: Large  / Bili: Small / Urobili: 3 mg/dL   Blood: x / Protein: 30 mg/dL / Nitrite: Negative   Leuk Esterase: Negative / RBC: 3 /HPF / WBC 1 /HPF   Sq Epi: x / Non Sq Epi: 1 /HPF / Bacteria: Negative            Lactate Trend            CAPILLARY BLOOD GLUCOSE      POCT Blood Glucose.: 93 mg/dL (2022 17:07)

## 2022-06-03 NOTE — BH INPATIENT PSYCHIATRY ASSESSMENT NOTE - NSBHMETABOLIC_PSY_ALL_CORE_FT
BMI:   HbA1c: A1C with Estimated Average Glucose Result: 5.1 % (06-03-22 @ 08:10)    Glucose: POCT Blood Glucose.: 93 mg/dL (06-01-22 @ 17:07)    BP: 117/64 (06-03-22 @ 08:08) (106/73 - 130/74)  Lipid Panel: Date/Time: 06-03-22 @ 08:10  Cholesterol, Serum: 179  Direct LDL: --  HDL Cholesterol, Serum: 29  Total Cholesterol/HDL Ration Measurement: --  Triglycerides, Serum: 119

## 2022-06-03 NOTE — PSYCHIATRIC REHAB INITIAL EVALUATION - NSBHALCSUBCHOICE_PSY_ALL_CORE
Pt reported he has been smoking marijuana a few times a week for the past few years. Pt reported his last use was last Saturday or Sunday.

## 2022-06-03 NOTE — BH INPATIENT PSYCHIATRY ASSESSMENT NOTE - CASE SUMMARY
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 Nicholas largely denies all psychiatric symptoms today including passive/active SI. He does report that he has struggled at times after the sudden death of his father but denies s/s of depression. States that he told family and social media that he was suicidal recently so that people would not make judgments about his decision to drive on the wrong side of the road to avoid traffic. Collateral from mom suggests recent depressive symptoms with isolation and hypersomnia.    Will continue home medications for now with considerations for increase in Prozac dose. Engage in I/G/M therapy and continue to assess for safety and S/I.

## 2022-06-03 NOTE — BH INPATIENT PSYCHIATRY ASSESSMENT NOTE - CURRENT MEDICATION
MEDICATIONS  (STANDING):  FLUoxetine 60 milliGRAM(s) Oral at bedtime  methylphenidate 10 milliGRAM(s) Oral <User Schedule>  QUEtiapine 25 milliGRAM(s) Oral at bedtime    MEDICATIONS  (PRN):  diphenhydrAMINE 50 milliGRAM(s) Oral every 6 hours PRN agitation/eps ppx  diphenhydrAMINE Injectable 50 milliGRAM(s) IntraMuscular once PRN eps ppx/severe agitation  haloperidol     Tablet 5 milliGRAM(s) Oral every 6 hours PRN agitation  haloperidol    Injectable 5 milliGRAM(s) IntraMuscular once PRN severe agitation  hydrOXYzine hydrochloride 25 milliGRAM(s) Oral every 6 hours PRN anxiety  LORazepam     Tablet 2 milliGRAM(s) Oral every 6 hours PRN agitation  LORazepam   Injectable 2 milliGRAM(s) IntraMuscular once PRN severe agitation  melatonin. 3 milliGRAM(s) Oral at bedtime PRN Insomnia

## 2022-06-04 PROCEDURE — 99231 SBSQ HOSP IP/OBS SF/LOW 25: CPT

## 2022-06-04 RX ADMIN — Medication 60 MILLIGRAM(S): at 21:33

## 2022-06-04 RX ADMIN — Medication 10 MILLIGRAM(S): at 13:22

## 2022-06-04 RX ADMIN — QUETIAPINE FUMARATE 25 MILLIGRAM(S): 200 TABLET, FILM COATED ORAL at 21:33

## 2022-06-04 RX ADMIN — Medication 3 MILLIGRAM(S): at 21:53

## 2022-06-04 NOTE — BH INPATIENT PSYCHIATRY PROGRESS NOTE - PRN MEDS
MEDICATIONS  (PRN):  diphenhydrAMINE 50 milliGRAM(s) Oral every 6 hours PRN agitation/eps ppx  diphenhydrAMINE Injectable 50 milliGRAM(s) IntraMuscular once PRN eps ppx/severe agitation  haloperidol     Tablet 5 milliGRAM(s) Oral every 6 hours PRN agitation  haloperidol    Injectable 5 milliGRAM(s) IntraMuscular once PRN severe agitation  hydrOXYzine hydrochloride 25 milliGRAM(s) Oral every 6 hours PRN anxiety  LORazepam     Tablet 2 milliGRAM(s) Oral every 6 hours PRN agitation  LORazepam   Injectable 2 milliGRAM(s) IntraMuscular once PRN severe agitation  melatonin. 3 milliGRAM(s) Oral at bedtime PRN Insomnia

## 2022-06-04 NOTE — BH INPATIENT PSYCHIATRY PROGRESS NOTE - NSBHFUPINTERVALHXFT_PSY_A_CORE
Patient seen for follow up of depressive symptoms  Chart reviewed and case discussed with staff  No events overnight  Anxious on interview and anxious that he is not on correct meds from outpt MD  Focus for patient remains loss of father

## 2022-06-04 NOTE — BH INPATIENT PSYCHIATRY PROGRESS NOTE - NSBHASSESSSUMMFT_PSY_ALL_CORE
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 Kurtz since 12yo, last hospitalized at [facility] in 2012, with one prior suicide attempt, no known hx of violence, history of arrest for marijuana possession, uses cannabis occasionally, no hx of withdrawals/DTs, no sig past medical hx, BIB mother/aunt/uncle referred by patient's psychiatrist due to recent behavior concerning for suicide attempt. Patient hospitalized to [facility] 1S for further stabilization.     DDx: Major Depressive Disorder    6/4 Remains anxious and depressed  Will continue Prozac and Seroquel for antidepressive effects, Ritalin for ADHD.   Continue to assess SI and monitor for safety.    Plan:  1.	Legal: continue 9.39 status  2.	Safety: routine obs appropriate as pt denying active SI/HI; Haldol/Ativan/Benadryl PRN agitation  3.	Psychiatric:   -continue Ritalin 10mg BID for ADHD  -continue Seroquel 25mg qhs for sleep/mood  -continue Prozac 60mg qhs with plan to discuss titration to 80mg with pt/family  4.	Individual/Group/Milieu therapy   5.	Medical: no acute issues  6.	Collateral/Dispo: See  chart note for collateral from mother and outpatient psychiatrist after receiving verbal consent. Dispo upon improvement of depressive symptoms and safety

## 2022-06-04 NOTE — BH INPATIENT PSYCHIATRY PROGRESS NOTE - NSBHMETABOLIC_PSY_ALL_CORE_FT
BMI:   HbA1c: A1C with Estimated Average Glucose Result: 5.1 % (06-03-22 @ 08:10)    Glucose: POCT Blood Glucose.: 93 mg/dL (06-01-22 @ 17:07)    BP: 124/63 (06-04-22 @ 06:19) (106/73 - 130/74)  Lipid Panel: Date/Time: 06-03-22 @ 08:10  Cholesterol, Serum: 179  Direct LDL: --  HDL Cholesterol, Serum: 29  Total Cholesterol/HDL Ration Measurement: --  Triglycerides, Serum: 119

## 2022-06-04 NOTE — BH INPATIENT PSYCHIATRY PROGRESS NOTE - NSBHCHARTREVIEWVS_PSY_A_CORE FT
Vital Signs Last 24 Hrs  T(C): 36.4 (06-04-22 @ 06:19), Max: 36.8 (06-03-22 @ 20:26)  T(F): 97.5 (06-04-22 @ 06:19), Max: 98.3 (06-03-22 @ 20:26)  HR: 78 (06-04-22 @ 06:19) (78 - 78)  BP: 124/63 (06-04-22 @ 06:19) (124/63 - 124/63)  BP(mean): --  RR: 16 (06-04-22 @ 06:19) (16 - 16)  SpO2: --

## 2022-06-05 PROCEDURE — 99231 SBSQ HOSP IP/OBS SF/LOW 25: CPT

## 2022-06-05 RX ADMIN — Medication 3 MILLIGRAM(S): at 22:46

## 2022-06-05 RX ADMIN — Medication 10 MILLIGRAM(S): at 12:01

## 2022-06-05 RX ADMIN — Medication 60 MILLIGRAM(S): at 21:56

## 2022-06-05 RX ADMIN — QUETIAPINE FUMARATE 25 MILLIGRAM(S): 200 TABLET, FILM COATED ORAL at 21:56

## 2022-06-05 RX ADMIN — Medication 10 MILLIGRAM(S): at 09:29

## 2022-06-05 NOTE — BH INPATIENT PSYCHIATRY PROGRESS NOTE - NSBHCHARTREVIEWVS_PSY_A_CORE FT
Vital Signs Last 24 Hrs  T(C): 36.3 (06-05-22 @ 08:27), Max: 36.3 (06-05-22 @ 08:27)  T(F): 97.3 (06-05-22 @ 08:27), Max: 97.3 (06-05-22 @ 08:27)  HR: 81 (06-05-22 @ 08:27) (81 - 81)  BP: 134/66 (06-05-22 @ 08:27) (134/66 - 134/66)  BP(mean): --  RR: --  SpO2: --

## 2022-06-05 NOTE — BH INPATIENT PSYCHIATRY PROGRESS NOTE - NSBHASSESSSUMMFT_PSY_ALL_CORE
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 Kurtz since 12yo, last hospitalized at [facility] in 2012, with one prior suicide attempt, no known hx of violence, history of arrest for marijuana possession, uses cannabis occasionally, no hx of withdrawals/DTs, no sig past medical hx, BIB mother/aunt/uncle referred by patient's psychiatrist due to recent behavior concerning for suicide attempt. Patient hospitalized to [facility] 1S for further stabilization.     DDx: Major Depressive Disorder    6/5 Reports feeling anxious and depressed  Will continue fluoxetine and Quetiapine for antidepressive effects, Ritalin for ADHD.   Continue to assess SI and monitor for safety.    Plan:  1.	Legal: continue 9.39 status  2.	Safety: routine obs appropriate as pt denying active SI/HI; Haldol/Ativan/Benadryl PRN agitation  3.	Psychiatric:   -continue Ritalin 10mg BID for ADHD  -continue Seroquel 25mg qhs for sleep/mood  -continue Prozac 60mg qhs with plan to discuss titration to 80mg with pt/family  4.	Individual/Group/Milieu therapy   5.	Medical: no acute issues  6.	Collateral/Dispo: See  chart note for collateral from mother and outpatient psychiatrist after receiving verbal consent. Dispo upon improvement of depressive symptoms and safety

## 2022-06-05 NOTE — BH INPATIENT PSYCHIATRY PROGRESS NOTE - NSBHFUPINTERVALHXFT_PSY_A_CORE
Patient seen for follow up of depressive symptoms  Chart reviewed and case discussed with staff  No events overnight  Anxious and reports feeling "tired"  Adjusting to the unit   requesting sharps to cut his nails   Denies hopelessness and suicidal ideation

## 2022-06-05 NOTE — BH INPATIENT PSYCHIATRY PROGRESS NOTE - NSBHMETABOLIC_PSY_ALL_CORE_FT
BMI:   HbA1c: A1C with Estimated Average Glucose Result: 5.1 % (06-03-22 @ 08:10)    Glucose: POCT Blood Glucose.: 93 mg/dL (06-01-22 @ 17:07)    BP: 134/66 (06-05-22 @ 08:27) (117/64 - 134/66)  Lipid Panel: Date/Time: 06-03-22 @ 08:10  Cholesterol, Serum: 179  Direct LDL: --  HDL Cholesterol, Serum: 29  Total Cholesterol/HDL Ration Measurement: --  Triglycerides, Serum: 119

## 2022-06-06 PROCEDURE — 90853 GROUP PSYCHOTHERAPY: CPT

## 2022-06-06 PROCEDURE — 99232 SBSQ HOSP IP/OBS MODERATE 35: CPT

## 2022-06-06 RX ADMIN — Medication 10 MILLIGRAM(S): at 11:52

## 2022-06-06 RX ADMIN — QUETIAPINE FUMARATE 25 MILLIGRAM(S): 200 TABLET, FILM COATED ORAL at 21:47

## 2022-06-06 RX ADMIN — Medication 3 MILLIGRAM(S): at 23:52

## 2022-06-06 RX ADMIN — Medication 60 MILLIGRAM(S): at 21:46

## 2022-06-06 RX ADMIN — Medication 10 MILLIGRAM(S): at 08:23

## 2022-06-06 NOTE — BH INPATIENT PSYCHIATRY PROGRESS NOTE - NSBHASSESSSUMMFT_PSY_ALL_CORE
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 Kurtz since 14yo, last hospitalized at [facility] in 2012, with one prior suicide attempt, no known hx of violence, history of arrest for marijuana possession, uses cannabis occasionally, no hx of withdrawals/DTs, no sig past medical hx, BIB mother/aunt/uncle referred by patient's psychiatrist due to recent behavior concerning for suicide attempt. Patient hospitalized to [facility] 1S for further stabilization.     DDx: Major Depressive Disorder    Today patient presents with giraldo affect, more reactive, less depressive symptoms. Discussed emotional regulation, and skills patient learned from DBT groups. No need for change in Prozac dose now, c/w Prozac 60mg, Ritalin 10mg BID, Seroquel 25mg.     Plan:  1.	Legal: continue 9.39 status  2.	Safety: routine obs appropriate as pt denying active SI/HI; Haldol/Ativan/Benadryl PRN agitation  3.	Psychiatric:   -continue Ritalin 10mg BID for ADHD  -continue Seroquel 25mg qhs for sleep/mood  -continue Prozac 60mg qhs  4.	Individual/Group/Milieu therapy   5.	Medical: no acute issues  6.	Collateral/Dispo: See  chart note for collateral from mother and outpatient psychiatrist after receiving verbal consent. 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 Kurtz since 12yo, last hospitalized at [facility] in 2012, with one prior suicide attempt, no known hx of violence, history of arrest for marijuana possession, uses cannabis occasionally, no hx of withdrawals/DTs, no sig past medical hx, BIB mother/aunt/uncle referred by patient's psychiatrist due to recent behavior concerning for suicide attempt. Patient hospitalized on [facility] 1S for further stabilization.     DDx: Major Depressive Disorder    Today patient presents with giraldo affect, more reactive, less depressive symptoms. Discussed emotional regulation, and skills patient learned from DBT groups. No need for change in Prozac dose now, c/w Prozac 60mg, Ritalin 10mg BID, Seroquel 25mg.     Plan:  1.	Legal: continue 9.39 status  2.	Safety: routine obs appropriate as pt denying active SI/HI; Haldol/Ativan/Benadryl PRN agitation  3.	Psychiatric:   -continue Ritalin 10mg BID for ADHD  -continue Seroquel 25mg qhs for sleep/mood  -continue Prozac 60mg qhs for depression  4.	Individual/Group/Milieu therapy   5.	Medical: no acute issues  6.	Collateral/Dispo: See  chart note for collateral from mother and outpatient psychiatrist after receiving verbal consent. Expand collateral from individual therapist. Dispo upon improvement of depressive symptoms and safety

## 2022-06-06 NOTE — BH INPATIENT PSYCHIATRY PROGRESS NOTE - NSBHFUPINTERVALHXFT_PSY_A_CORE
Chart reviewed. Case discussed with multidisciplinary team. No acute events overnight. No PRNs required/requested. Per sleep log, patient slept well overnight.    Patient states that he feels great. Over the weekend he has attending group therapies, where he learns about how to deal with his emotions. He says he applies mindfulness to his life, where he uses deep breathing when feeling overwhelmed. Patient states that he is sleeping fine, has good appetite. Discussed presenting issue of him making suicidal comments after driving on wrong side of the road, even though he wasn't feeling suicidal. He states that he has learned to talk to his psychiatrist, psychologist, or his family if feeling emotionally dysregulated. Patient states that he now feels like he is in the right state of mind, describes feeling like he is in a new universe, is more future oriented and has positive outlook on his life including working and seeing his family. He denies SI. He requests a form excusing him from work during times of hospitalization. Patient thinking about going to [facility] finals, says he typically went with his father, who is now . He asked if it is a good idea to go this year. Discussed with patient, and he said if his cousin cannot go, then he would not go. He identified cousin, aunt and uncle as strong social supports for him.

## 2022-06-06 NOTE — BH INPATIENT PSYCHIATRY PROGRESS NOTE - NSBHMETABOLIC_PSY_ALL_CORE_FT
BMI:   HbA1c: A1C with Estimated Average Glucose Result: 5.1 % (06-03-22 @ 08:10)    Glucose: POCT Blood Glucose.: 93 mg/dL (06-01-22 @ 17:07)    BP: 129/76 (06-06-22 @ 08:39) (124/63 - 134/66)  Lipid Panel: Date/Time: 06-03-22 @ 08:10  Cholesterol, Serum: 179  Direct LDL: --  HDL Cholesterol, Serum: 29  Total Cholesterol/HDL Ration Measurement: --  Triglycerides, Serum: 119

## 2022-06-06 NOTE — BH INPATIENT PSYCHIATRY PROGRESS NOTE - NSBHCHARTREVIEWVS_PSY_A_CORE FT
Vital Signs Last 24 Hrs  T(C): 36.3 (06-06-22 @ 08:39), Max: 36.6 (06-05-22 @ 20:59)  T(F): 97.4 (06-06-22 @ 08:39), Max: 97.9 (06-05-22 @ 20:59)  HR: 72 (06-06-22 @ 08:39) (72 - 72)  BP: 129/76 (06-06-22 @ 08:39) (129/76 - 129/76)  BP(mean): --  RR: --  SpO2: --

## 2022-06-07 PROCEDURE — 99232 SBSQ HOSP IP/OBS MODERATE 35: CPT

## 2022-06-07 PROCEDURE — 90853 GROUP PSYCHOTHERAPY: CPT

## 2022-06-07 RX ADMIN — Medication 10 MILLIGRAM(S): at 08:48

## 2022-06-07 RX ADMIN — QUETIAPINE FUMARATE 25 MILLIGRAM(S): 200 TABLET, FILM COATED ORAL at 22:08

## 2022-06-07 RX ADMIN — Medication 60 MILLIGRAM(S): at 22:08

## 2022-06-07 RX ADMIN — Medication 10 MILLIGRAM(S): at 12:04

## 2022-06-07 NOTE — BH PSYCHOLOGY - GROUP THERAPY NOTE - NSPSYCHOLGRPDBTPROB_PSY_A_CORE
depressed mood/suicidal gestures/suicidal ideation
depressed mood/suicidal gestures/suicidal ideation

## 2022-06-07 NOTE — BH PSYCHOLOGY - GROUP THERAPY NOTE - TOKEN PULL-DIAGNOSIS
Primary Diagnosis:  Depression [F32.A]        Problem Dx:   ADHD [F90.9]      Depression [F32.A]      

## 2022-06-07 NOTE — BH INPATIENT PSYCHIATRY PROGRESS NOTE - NSBHMETABOLIC_PSY_ALL_CORE_FT
BMI:   HbA1c: A1C with Estimated Average Glucose Result: 5.1 % (06-03-22 @ 08:10)    Glucose: POCT Blood Glucose.: 93 mg/dL (06-01-22 @ 17:07)    BP: 129/76 (06-06-22 @ 08:39) (129/76 - 134/66)  Lipid Panel: Date/Time: 06-03-22 @ 08:10  Cholesterol, Serum: 179  Direct LDL: --  HDL Cholesterol, Serum: 29  Total Cholesterol/HDL Ration Measurement: --  Triglycerides, Serum: 119

## 2022-06-07 NOTE — BH PSYCHOLOGY - GROUP THERAPY NOTE - NSPSYCHOLGRPDBTPT_PSY_A_CORE FT
DBT Group is a group in which patients learn skills to better manage their emotions and behaviors. Group begins with a mindfulness practice so that patients have an opportunity to practice observing their internal and external experiences. Following the mindfulness exercise the group learns new skills in a didactic format. Group today focused on the “distress tolerance” module. Specifically, patients learned the “IMPROVE” skill which involves using mental techniques to lower distress. These techniques include using imagery, making meaning, using prayer, focusing on one thing at a time, taking a vacation, and using encouragement. Patients practiced some of these skills in session as they were guided through a relaxation and grounding exercise.
DBT Group is a group in which patients learn skills to better manage their emotions and behaviors. Group begins with a mindfulness practice so that patients have an opportunity to practice observing their internal and external experiences.  Following the mindfulness exercise the group learns new skills in a didactic format. Pts were taught the concept of “wise mind,” which is about identifying different states of mind (emotional vs. reasonable mind) and finding ways to tap into wise mind which is a blend of the two, and the state of mind that is consistent with wise decision making and long term goals and values.

## 2022-06-07 NOTE — BH PSYCHOLOGY - GROUP THERAPY NOTE - NSPSYCHOLGRPDBTPT_PSY_A_CORE
stable mood and affect in group/no self-destructive impulses/behaviors/Patient able to identify mood states/other...
stable mood and affect in group/no self-destructive impulses/behaviors/other...

## 2022-06-07 NOTE — BH INPATIENT PSYCHIATRY PROGRESS NOTE - NSBHASSESSSUMMFT_PSY_ALL_CORE
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 Kurtz since 14yo, last hospitalized at [facility] in 2012, with one prior suicide attempt, no known hx of violence, history of arrest for marijuana possession, uses cannabis occasionally, no hx of withdrawals/DTs, no sig past medical hx, BIB mother/aunt/uncle referred by patient's psychiatrist due to recent behavior concerning for suicide attempt. Patient hospitalized to [facility] 1S for further stabilization.     DDx: Major Depressive Disorder    Today patient presents with giraldo affect, more reactive, less depressive symptoms. Discussed emotional regulation, and skills patient learned from DBT groups. No need for change in Prozac dose now, c/w Prozac 60mg, Ritalin 10mg BID, Seroquel 25mg.     Plan:  1.	Legal: continue 9.39 status  2.	Safety: routine obs appropriate as pt denying active SI/HI; Haldol/Ativan/Benadryl PRN agitation  3.	Psychiatric:   -continue Ritalin 10mg BID for ADHD  -continue Seroquel 25mg qhs for sleep/mood  -continue Prozac 60mg qhs  4.	Individual/Group/Milieu therapy   5.	Medical: no acute issues  6.	Collateral/Dispo: See  chart note for collateral from mother and outpatient psychiatrist after receiving verbal consent. 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 Kurtz since 14yo, last hospitalized at [facility] in 2012, with one prior suicide attempt, no known hx of violence, history of arrest for marijuana possession, uses cannabis occasionally, no hx of withdrawals/DTs, no sig past medical hx, BIB mother/aunt/uncle referred by patient's psychiatrist due to recent behavior concerning for suicide attempt. Patient hospitalized to [facility] 1S for further stabilization.     DDx: Major Depressive Disorder    Today patient continues to display more expressive affect, denies depressive symptoms or SI. He reports benefit from group therapies, learning skills to cope with emotions. Patient future-oriented, discharge focused. Will likely pursue d/c this week.    Plan:  1.	Legal: continue 9.39 status  2.	Safety: routine obs appropriate as pt denying active SI/HI; Haldol/Ativan/Benadryl PRN agitation  3.	Psychiatric:   -continue Ritalin 10mg BID for ADHD  -continue Seroquel 25mg qhs for sleep/mood  -continue Prozac 60mg qhs  4.	Individual/Group/Milieu therapy   5.	Medical: no acute issues  6.	Collateral/Dispo: See  chart note for collateral from mother and outpatient psychiatrist after receiving verbal consent. 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 Kurtz since 12yo, last hospitalized at [facility] in 2012, with one prior suicide attempt, no known hx of violence, history of arrest for marijuana possession, uses cannabis occasionally, no hx of withdrawals/DTs, no sig past medical hx, BIB mother/aunt/uncle referred by patient's psychiatrist due to recent behavior concerning for suicide attempt. Patient hospitalized to [facility] 1S for further stabilization.     DDx: Major Depressive Disorder, ASD (per history), ADHD (per history)    Today patient continues to display more expressive affect, denies depressive symptoms or SI. He reports benefit from group therapies, learning skills to cope with emotions. Patient future-oriented, discharge focused. Will likely pursue d/c this week if gains maintained.    Plan:  1.	Legal: continue 9.39 status  2.	Safety: routine obs appropriate as pt denying active SI/HI; Haldol/Ativan/Benadryl PRN agitation  3.	Psychiatric:   -continue Ritalin 10mg BID for ADHD  -continue Seroquel 25mg qhs for sleep/mood  -continue Prozac 60mg qhs for depression  4.	Individual/Group/Milieu therapy   5.	Medical: no acute issues  6.	Collateral/Dispo: See  chart note for collateral from mother and outpatient psychiatrist after receiving verbal consent. Dispo upon improvement of depressive symptoms and safety

## 2022-06-07 NOTE — BH DISCHARGE NOTE NURSING/SOCIAL WORK/PSYCH REHAB - NSDCPRRECOMMEND_PSY_ALL_CORE
Psychiatric rehabilitation staff recommends patient will benefit from seeing Dr. Kurtz for medication management, support, and psychotherapy

## 2022-06-07 NOTE — BH PSYCHOLOGY - GROUP THERAPY NOTE - NSPSYCHOLGRPDBTGOAL_PSY_A_CORE
reduce mood and affective lability/reduce suicidal ideation/risk of attempt/improve ability to indentify feelings/improve ability to communicate feelings/reduce vulnerability to emotional dysregualation/promote skills to reduce anger
reduce mood and affective lability/reduce suicidal ideation/risk of attempt/improve ability to indentify feelings/improve ability to communicate feelings/reduce vulnerability to emotional dysregualation/promote skills to reduce anger

## 2022-06-07 NOTE — BH DISCHARGE NOTE NURSING/SOCIAL WORK/PSYCH REHAB - NSDCPRGOAL_PSY_ALL_CORE
During the current hospitalization, patient has been addressing psychiatric rehabilitation goals pertaining to identifying coping skills that assist in improving mood. Patient has demonstrated progress towards psychiatric rehabilitation goals during the current hospitalization. Patient exhibited progress through participating in individual and group therapy and developing additional coping skills to assist with managing negative emotions such as TIPP skills, deep breathing techniques, using a stress ball, and reading. Writer encouraged patient to continue to strengthen and practice effective skills. Patient was receptive. Patient met goal of identifying coping skills by reporting these skills have helped him during current hospitalization. Patient reports he will continue to practice coping skills. Patient reports overall improvement in mood. Patient reports feeling happy to go home. Patient reports he is looking forward to being in his own house. Patient completed safety plan with unit staff. Patient was compliant with medications during current hospitalization. Patient was visible on the unit and was appropriate with peers and staff. Patient was provided with a Press Ganey survey prior to discharge.

## 2022-06-07 NOTE — BH DISCHARGE NOTE NURSING/SOCIAL WORK/PSYCH REHAB - DISCHARGE INSTRUCTIONS AFTERCARE APPOINTMENTS
In order to check the location, date, or time of your aftercare appointment, please refer to your Discharge Instructions Document given to you upon leaving the hospital.  If you have lost the instructions please call [phone]

## 2022-06-07 NOTE — BH INPATIENT PSYCHIATRY PROGRESS NOTE - NSBHFUPINTERVALHXFT_PSY_A_CORE
Chart reviewed. Case discussed with multidisciplinary team. No acute events overnight. No PRNs required/requested. Per sleep log, patient slept well overnight.     Chart reviewed. Case discussed with multidisciplinary team. No acute events overnight. No PRNs required/requested. Per sleep log, fair sleep overnight.    Patient states that he feels good, has been socializing more on the unit, is going to groups to learn how to deal with his depression. He says he is practicing deep breathing, and today learned about TIPPs, which is a skill he may use in the future when he becomes increasingly emotional. Patient states that he thinks he became more depressed around Memorial Day because he was thinking more about the [employer] draft, which he always attended with his father. He states that he plans on talking out his emotions more, wants to see his therapist on the weekends. He states he hasn't seen his therapist for a while because of work, but he is eager to speak with her now. Patient states he works M-F 8-4. He denies depressed mood or SI. He reports good sleep and appetite. Patient states that his mother visited and they had a good discussion. He says he is close with his mother, and identified her as someone who he can talk to when feeling down. Patient asked about discharge several times.

## 2022-06-07 NOTE — BH DISCHARGE NOTE NURSING/SOCIAL WORK/PSYCH REHAB - PATIENT PORTAL LINK FT
You can access the FollowMyHealth Patient Portal offered by [facility] by registering at the following website: [email or web address]. By joining [facility]’s FollowMyHealth portal, you will also be able to view your health information using other applications (apps) compatible with our system.

## 2022-06-07 NOTE — BH DISCHARGE NOTE NURSING/SOCIAL WORK/PSYCH REHAB - NSCDUDCCRISIS_PSY_A_CORE
[facility]  1 [phone]) [facility]LL ([phone])  Text "WELL" to 97699  Website: www.Deepclass/.[facility] 1 (150) 221-NDCT (0593) Website: www.safehorizon.org/.National Suicide Prevention Lifeline 8 (062) 862-1485/.  [facility]  1 (224) LIFENET (139-2657)/.  [facility]  [address] [place] [address] [phone]   Hours:  Monday through Friday from 9 AM to 3 PM/.  [place] [employer]  [employer] - [facility] Crisis Line  [phone], Option 1

## 2022-06-07 NOTE — BH INPATIENT PSYCHIATRY PROGRESS NOTE - NSBHCHARTREVIEWVS_PSY_A_CORE FT
Vital Signs Last 24 Hrs  T(C): 36 (06-07-22 @ 08:13), Max: 36.6 (06-06-22 @ 20:34)  T(F): 96.8 (06-07-22 @ 08:13), Max: 97.9 (06-06-22 @ 20:34)  HR: --  BP: --  BP(mean): --  RR: --  SpO2: --    Orthostatic VS  06-07-22 @ 08:13  Lying BP: --/-- HR: --  Sitting BP: 128/66 HR: 77  Standing BP: --/-- HR: --  Site: --  Mode: --

## 2022-06-08 PROCEDURE — 99232 SBSQ HOSP IP/OBS MODERATE 35: CPT

## 2022-06-08 RX ORDER — METHYLPHENIDATE HCL 5 MG
1 TABLET ORAL
Qty: 0 | Refills: 0 | DISCHARGE
Start: 2022-06-08

## 2022-06-08 RX ORDER — FLUOXETINE HCL 10 MG
3 CAPSULE ORAL
Qty: 0 | Refills: 0 | DISCHARGE
Start: 2022-06-08

## 2022-06-08 RX ORDER — SIMETHICONE 80 MG/1
80 TABLET, CHEWABLE ORAL ONCE
Refills: 0 | Status: DISCONTINUED | OUTPATIENT
Start: 2022-06-08 | End: 2022-06-09

## 2022-06-08 RX ORDER — QUETIAPINE FUMARATE 200 MG/1
1 TABLET, FILM COATED ORAL
Qty: 0 | Refills: 0 | DISCHARGE
Start: 2022-06-08

## 2022-06-08 RX ADMIN — Medication 3 MILLIGRAM(S): at 00:14

## 2022-06-08 RX ADMIN — QUETIAPINE FUMARATE 25 MILLIGRAM(S): 200 TABLET, FILM COATED ORAL at 21:48

## 2022-06-08 RX ADMIN — Medication 3 MILLIGRAM(S): at 23:36

## 2022-06-08 RX ADMIN — Medication 60 MILLIGRAM(S): at 21:48

## 2022-06-08 RX ADMIN — Medication 10 MILLIGRAM(S): at 14:07

## 2022-06-08 RX ADMIN — Medication 10 MILLIGRAM(S): at 08:51

## 2022-06-08 NOTE — BH PSYCHOLOGY - CLINICIAN PSYCHOTHERAPY NOTE - NSBHPSYCHOLNARRATIVE_PSY_A_CORE FT
Patient was alert, guarded, and in control. Oriented x3. Casually dressed, fairly groomed. Intermittent eye contact. Speech normal in rate and volume, slight monotone, patient tended to offer limited 1-2 word responses even to open ended questions. No abnormal psychomotor behavior. Mood "fine" with constricted affect. Thought process linear, goal-directed. Thought content relevant to discussion, notable for discharge focus. Denied auditory/visual hallucinations and suicidal/homicidal ideation, intent, plan, and urges to self-harm. Impulse control currently appears intact. Insight and judgment poor.    Session focused on antecedents to hospitalization. Patient denied any symptoms of depression except some sadness about the loss of his dad in December 2021. He denied taking pills and denied that there was any suicidal or self-injurious intent in driving the wrong way on the road, insisting that he only did it to get around a long line of cars. Patient denied any changes in mood leading up to this incident. Patient was oriented to DBT treatment model, informed of CAPS protocol and provided with diary card which he committed to completing. 
Patient was alert, guarded, and in control. Oriented x3. Casually dressed, fairly groomed. Intermittent eye contact. Speech normal in rate and volume, slight monotone, patient tended to offer limited 1-2 word responses even to open ended questions. No abnormal psychomotor behavior. Mood "really good" with constricted affect. Thought process linear, goal-directed. Thought content relevant to discussion, notable for discharge focus. Denied auditory/visual hallucinations and suicidal/homicidal ideation, intent, plan, and urges to self-harm. Impulse control currently appears intact. Insight and judgment poor.    Session focused on developing more adaptive coping skills to deal with intense frustration to avoid making impulsive choices (such as driving on the wrong side of the road) in the future. TIPP skill was taught and specific ways of practicing in the hospital were reviewed. Patient committed to practicing later today. 
Patient was alert, guarded, and in control. Oriented x3. Casually dressed, fairly groomed. Intermittent eye contact. Speech normal in rate and volume, slight monotone. No abnormal psychomotor behavior. Mood "good" with constricted affect. Thought process linear, goal-directed. Thought content relevant to discussion, notable for discharge focus. Denied auditory/visual hallucinations and suicidal/homicidal ideation, intent, plan, and urges to self-harm. Impulse control currently appears intact. Insight and judgment fair to poor.    Session focused on safety planning for after discharge. Patient expressed feeling readiness to cope more effectively with stressors in daily life. Patient was easily able to identify warning signs and effective coping behaviors and social supports. See  safety plan for additional details.

## 2022-06-08 NOTE — BH PSYCHOLOGY - CLINICIAN PSYCHOTHERAPY NOTE - NSBHPSYCHOLINT_PSY_A_CORE
Cognitive/behavioral therapy/Dialectical  Behavioral Therapy (DBT)/Treatment compliance encouraged
Cognitive/behavioral therapy/Dialectical  Behavioral Therapy (DBT)/Supported coping skills/Treatment compliance encouraged
Cognitive/behavioral therapy/Dialectical  Behavioral Therapy (DBT)/Supported coping skills/Treatment compliance encouraged

## 2022-06-08 NOTE — BH INPATIENT PSYCHIATRY PROGRESS NOTE - NSBHASSESSSUMMFT_PSY_ALL_CORE
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 Kurtz since 14yo, last hospitalized at [facility] in 2012, with one prior suicide attempt, no known hx of violence, history of arrest for marijuana possession, uses cannabis occasionally, no hx of withdrawals/DTs, no sig past medical hx, BIB mother/aunt/uncle referred by patient's psychiatrist due to recent behavior concerning for suicide attempt. Patient hospitalized to [facility] 1S for further stabilization.     DDx: Major Depressive Disorder    Today, patient reports improvement in depressive mood, ability to use DBT skills he has learned from group therapies. Discussed past impulse control and poor judgements. Patient future-oriented, and motivated to continue treatment outpatient. Will likely continue with d/c tomorrow. No med changes.    Plan:  1.	Legal: continue 9.39 status  2.	Safety: routine obs appropriate as pt denying active SI/HI; Haldol/Ativan/Benadryl PRN agitation  3.	Psychiatric: C/w home meds;  -continue Ritalin 10mg BID for ADHD  -continue Seroquel 25mg qhs for sleep/mood  -continue Prozac 60mg qhs  4.	Individual/Group/Milieu therapy   5.	Medical: no acute issues  6.	Collateral/Dispo: See  chart note for collateral from mother and outpatient psychiatrist after receiving verbal consent. 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 Kurtz since 12yo, last hospitalized at [facility] in 2012, with one prior suicide attempt, no known hx of violence, history of arrest for marijuana possession, uses cannabis occasionally, no hx of withdrawals/DTs, no sig past medical hx, BIB mother/aunt/uncle referred by patient's psychiatrist due to recent behavior concerning for suicide attempt. Patient hospitalized to [facility] 1S for further stabilization.     DDx: Major Depressive Disorder    Today, patient reports improvement in depressive mood, ability to use DBT skills he has learned from group therapies. Discussed past impulse control and poor judgement. Patient future-oriented, and motivated to continue treatment outpatient and make better choices. Will likely continue with d/c tomorrow. No med changes.    Plan:  1.	Legal: continue 9.39 status  2.	Safety: routine obs appropriate as pt denying active SI/HI; Haldol/Ativan/Benadryl PRN agitation  3.	Psychiatric: C/w home meds  -continue Ritalin 10mg BID for ADHD  -continue Seroquel 25mg qhs for sleep/mood  -continue Prozac 60mg qhs for depression  4.	Individual/Group/Milieu therapy   5.	Medical: no acute issues  6.	Collateral/Dispo: See  chart note for collateral from mother and outpatient psychiatrist after receiving verbal consent. Dispo tomorrow if gains maintained

## 2022-06-08 NOTE — BH PSYCHOLOGY - CLINICIAN PSYCHOTHERAPY NOTE - NSTXDEPRESGOAL_PSY_ALL_CORE
Will identify 2 coping skills that assist in improving mood
Exhibit improvements in self-grooming, hygiene, sleep and appetite
Will identify 2 coping skills that assist in improving mood

## 2022-06-08 NOTE — BH PSYCHOLOGY - CLINICIAN PSYCHOTHERAPY NOTE - NSTXDCOTHRGOAL_PSY_ALL_CORE
Pt. will comply with treatment recommendations within seven days.

## 2022-06-08 NOTE — BH INPATIENT PSYCHIATRY PROGRESS NOTE - NSBHFUPINTERVALHXFT_PSY_A_CORE
Chart reviewed. Case discussed with multidisciplinary team. No acute events overnight. No PRNs required/requested. Per sleep log, fair sleep overnight.    Patient states that he feels good, has been talking to peers on the unit, going to groups and is learning skills from them. He is feeling tired this AM because he watched the [employer] game late last night. He says he feels a lot better compared to when he first came to the unit, as he has no SI, understands that he needs to control his impulses, and states he won't drive on the wrong side of the road again. Discussed poor judgement of reporting SI to family when he did not mean it. Patient says that his medications are helping him a lot, has no side effects, is unsure if he needs more medications at home, and asks if team can follow up with his mother. Patient sleeping well, has good appetite.

## 2022-06-08 NOTE — BH PSYCHOLOGY - CLINICIAN PSYCHOTHERAPY NOTE - NSBHPSYCHOLPROBS_PSY_ALL_CORE
Anger/Irritability/Attention Problems/Depression/Impulsivity/Suicidality
Anger/Irritability/Attention Problems/Depression/Suicidality
Anger/Irritability/Attention Problems/Depression/Impulsivity/Suicidality

## 2022-06-08 NOTE — BH INPATIENT PSYCHIATRY DISCHARGE NOTE - DESCRIPTION
Parents are , patient lives with mother. Father  unexpectedly in Dec 2021 at 62yo; patient was close with father. Grandfather  in 2021 at 91yo. 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

## 2022-06-08 NOTE — BH INPATIENT PSYCHIATRY PROGRESS NOTE - CURRENT MEDICATION
MEDICATIONS  (STANDING):  FLUoxetine 60 milliGRAM(s) Oral at bedtime  methylphenidate 10 milliGRAM(s) Oral <User Schedule>  QUEtiapine 25 milliGRAM(s) Oral at bedtime    MEDICATIONS  (PRN):  diphenhydrAMINE 50 milliGRAM(s) Oral every 6 hours PRN agitation/eps ppx  diphenhydrAMINE Injectable 50 milliGRAM(s) IntraMuscular once PRN eps ppx/severe agitation  haloperidol     Tablet 5 milliGRAM(s) Oral every 6 hours PRN agitation  haloperidol    Injectable 5 milliGRAM(s) IntraMuscular once PRN severe agitation  hydrOXYzine hydrochloride 25 milliGRAM(s) Oral every 6 hours PRN anxiety  LORazepam     Tablet 2 milliGRAM(s) Oral every 6 hours PRN agitation  LORazepam   Injectable 2 milliGRAM(s) IntraMuscular once PRN severe agitation  melatonin. 3 milliGRAM(s) Oral at bedtime PRN Insomnia   MEDICATIONS  (STANDING):  FLUoxetine 60 milliGRAM(s) Oral at bedtime  methylphenidate 10 milliGRAM(s) Oral <User Schedule>  QUEtiapine 25 milliGRAM(s) Oral at bedtime    MEDICATIONS  (PRN):  diphenhydrAMINE 50 milliGRAM(s) Oral every 6 hours PRN agitation/eps ppx  diphenhydrAMINE Injectable 50 milliGRAM(s) IntraMuscular once PRN eps ppx/severe agitation  haloperidol     Tablet 5 milliGRAM(s) Oral every 6 hours PRN agitation  haloperidol    Injectable 5 milliGRAM(s) IntraMuscular once PRN severe agitation  hydrOXYzine hydrochloride 25 milliGRAM(s) Oral every 6 hours PRN anxiety  LORazepam     Tablet 2 milliGRAM(s) Oral every 6 hours PRN agitation  LORazepam   Injectable 2 milliGRAM(s) IntraMuscular once PRN severe agitation  melatonin. 3 milliGRAM(s) Oral at bedtime PRN Insomnia  simethicone 80 milliGRAM(s) Chew once PRN Upset Stomach

## 2022-06-08 NOTE — BH INPATIENT PSYCHIATRY DISCHARGE NOTE - CASE SUMMARY
On day of discharge Nicholas reports good mood, denies symptoms of acute depressive episode, is sleeping and eating well, and is denying passive/active S/I. Is able to discuss safety plan, cope ahead and DBT skills he can use. Cites family supports. Is tolerating psychiatric medications. As per detailed risk assessment above the patient is no longer at acutely elevated risk of harm to self and no longer requires inpatient hospitalization. Will d/c home with mother to follow up with long time psychiatrist and therapist.

## 2022-06-08 NOTE — BH INPATIENT PSYCHIATRY DISCHARGE NOTE - HOSPITAL COURSE
Dates of Hospitalization:    On admission interview, patient presented with the following signs and symptoms: (short HPI). Diagnostic impression on admission was XXX.     Patient was started on medication X which was titrated to a dose of Y with good effect and tolerability.      Patient’s symptoms gradually improved over the course of the hospitalization.  At the time of discharge, patient reports improvement in XXX, and denies any active or passive suicidality. Patient is sleeping and eating well, with good energy and attention to ADLs.     There were no behavioral problems on the unit.  Patient did not become agitated and did not require emergent intramuscular medications or seclusion / restraints.  Patient did not self-harm on the unit.  Patient remained actively engaged in treatment.  Patient participated in individual, group, and milieu therapy.  Patient got along appropriately with staff and peers.  A family meeting was held at time of admission to provide psycho-education and to collaboratively treatment plan.  A second family meeting was held prior to discharge for safety planning and disposition planning.  Patient did not have any medical problems during hospitalization. There were no medical consults.     On day of discharge, the patient has improved significantly and no longer requires inpatient treatment and care. Patient denies all suicidal and aggressive ideation, intent and plan. Patient denies anxiety symptoms and panic attacks. Patient is not judged to be an acute danger to self or others at this time.    Risk Assessment:  The patient is at risk of chronic harm to self and others given XXX. Chronic mitigating factors include XXXX.     On admission, patient was felt to be at acute risk of harm to self due to XXX. Over the course of hosptialization, patient engaged appropriately in treatment and patient's symptoms improved. Mood lifted and stabilized, patient felt increasingly hopeful, sleep improved as did engagement in therapy and activites. The patient became increasingly future/goal oriented, with increased command of coping skills.     Given these improvements the patient is no longer felt to be at an acutely elevated risk of harm to self and [facility] Is stable for discharge and transition to outpatient treatment. The patient remains at chronic risk of harm to self and others due to chronic risk factors as above, which cannot be further mitigated by acute inpatient psychiatric hospitalization.     Patient will be discharged with the following DSM5 Diagnoses:    Patient will be discharged on the following medications:     Dates of Hospitalization:    On admission interview, patient presented with the following signs and symptoms: (short HPI). Diagnostic impression on admission was XXX.     Patient was started on medication X which was titrated to a dose of Y with good effect and tolerability.      Patient’s symptoms gradually improved over the course of the hospitalization.  At the time of discharge, patient reports improvement in XXX, and denies any active or passive suicidality. Patient is sleeping and eating well, with good energy and attention to ADLs.     There were no behavioral problems on the unit.  Patient did not become agitated and did not require emergent intramuscular medications or seclusion / restraints.  Patient did not self-harm on the unit.  Patient remained actively engaged in treatment.  Patient participated in individual, group, and milieu therapy.  Patient got along appropriately with staff and peers.  A family meeting was held at time of admission to provide psycho-education and to collaboratively treatment plan.  A second family meeting was held prior to discharge for safety planning and disposition planning.  Patient did not have any medical problems during hospitalization. There were no medical consults.     On day of discharge, the patient has improved significantly and no longer requires inpatient treatment and care. Patient denies all suicidal and aggressive ideation, intent and plan. Patient denies anxiety symptoms and panic attacks. Patient is not judged to be an acute danger to self or others at this time.    Risk Assessment:  The patient is at risk of chronic harm to self and others given history of expressing SI, diagnosis of MDD, ADHD, h/o hospitalizations, poor impulse control. Chronic mitigating factors include engaged in treatment, compliance with medications, motivation towards improvement, no h/o self harm/SA, no access to weapons, good social supports.     On admission, patient was felt to be at acute risk of harm to self due to expression of SI, depressed mood, poor ADLs, increasingly withdrawn. Over the course of hospitalization, patient engaged appropriately in treatment and patient's symptoms improved. Mood lifted and stabilized, patient felt increasingly hopeful, sleep improved as did engagement in therapy and activites. The patient became increasingly future/goal oriented, with increased command of coping skills.     Given these improvements the patient is no longer felt to be at an acutely elevated risk of harm to self and no longer requires inpatient level of care. Is stable for discharge and transition to outpatient treatment. The patient remains at chronic risk of harm to self and others due to chronic risk factors as above, which cannot be further mitigated by acute inpatient psychiatric hospitalization.     Patient will be discharged with the following DSM5 Diagnoses:  Major Depressive Disorder    Patient will be discharged on the following medications:  C/w home meds- Ritalin 10mg BID, Seroquel 25mg qhs, Prozac 60mg qhs. Patient had 7 day supply of Ritalin, 2 month supply of Prozac, 15 day supply of Seroquel at home. Since no medication changes were made, and patient had sufficient supply at home that will last enough until his appointment with outpatient psychiatrist, no medications sent.     Dates of Hospitalization:    On admission interview, patient presented with the following signs and symptoms: (short HPI). Diagnostic impression on admission was XXX.     Patient was started on medication X which was titrated to a dose of Y with good effect and tolerability.      Patient’s symptoms gradually improved over the course of the hospitalization.  At the time of discharge, patient reports improvement in XXX, and denies any active or passive suicidality. Patient is sleeping and eating well, with good energy and attention to ADLs.     There were no behavioral problems on the unit.  Patient did not become agitated and did not require emergent intramuscular medications or seclusion / restraints.  Patient did not self-harm on the unit.  Patient remained actively engaged in treatment.  Patient participated in individual, group, and milieu therapy.  Patient got along appropriately with staff and peers.  A family meeting was held at time of admission to provide psycho-education and to collaboratively treatment plan.  A second family meeting was held prior to discharge for safety planning and disposition planning.  Patient did not have any medical problems during hospitalization. There were no medical consults.     On day of discharge, the patient has improved significantly and no longer requires inpatient treatment and care. Patient denies all suicidal and aggressive ideation, intent and plan. Patient denies anxiety symptoms and panic attacks. Patient is not judged to be an acute danger to self or others at this time.    Risk Assessment:  The patient is at risk of chronic harm to self and others given history of expressing SI, diagnosis of MDD, ADHD, h/o hospitalizations, poor impulse control. Chronic mitigating factors include engaged in treatment, compliance with medications, motivation towards improvement, no h/o self harm/SA, no access to weapons, good social supports.     On admission, patient was felt to be at acute risk of harm to self due to expression of SI, depressed mood, poor ADLs, increasingly withdrawn. Over the course of hospitalization, patient engaged appropriately in treatment and patient's symptoms improved. Mood lifted and stabilized, patient felt increasingly hopeful, sleep improved as did engagement in therapy and activites. Patient learned appropriate coping skills w/DBT group therapies. The patient became increasingly future/goal oriented, with increased command of coping skills.     Given these improvements the patient is no longer felt to be at an acutely elevated risk of harm to self and no longer requires inpatient level of care. Is stable for discharge and transition to outpatient treatment. The patient remains at chronic risk of harm to self and others due to chronic risk factors as above, which cannot be further mitigated by acute inpatient psychiatric hospitalization.     Patient will be discharged with the following DSM5 Diagnoses:  Major Depressive Disorder    Patient will be discharged on the following medications:  C/w home meds- Ritalin 10mg BID, Seroquel 25mg qhs, Prozac 60mg qhs. Patient had 7 day supply of Ritalin, 2 month supply of Prozac, 15 day supply of Seroquel at home. Since no medication changes were made, and patient had sufficient supply at home that will last enough until his appointment with outpatient psychiatrist, no medications sent.     Dates of Hospitalization:    On admission interview, patient presented with the following signs and symptoms: (short HPI). Diagnostic impression on admission was XXX.     Patient was started on medication X which was titrated to a dose of Y with good effect and tolerability.      Patient’s symptoms gradually improved over the course of the hospitalization.  At the time of discharge, patient reports improvement in XXX, and denies any active or passive suicidality. Patient is sleeping and eating well, with good energy and attention to ADLs.     There were no behavioral problems on the unit.  Patient did not become agitated and did not require emergent intramuscular medications or seclusion / restraints.  Patient did not self-harm on the unit.  Patient remained actively engaged in treatment.  Patient participated in individual, group, and milieu therapy.  Patient got along appropriately with staff and peers.  A family meeting was held at time of admission to provide psycho-education and to collaboratively treatment plan.  A second family meeting was held prior to discharge for safety planning and disposition planning.  Patient did not have any medical problems during hospitalization. There were no medical consults.     On day of discharge, the patient has improved significantly and no longer requires inpatient treatment and care. Patient denies all suicidal and aggressive ideation, intent and plan. Patient denies anxiety symptoms and panic attacks. Patient is not judged to be an acute danger to self or others at this time.    Risk Assessment:  The patient is at risk of chronic harm to self and others given history of expressing SI and engaging in potentially suicidal behavior, diagnosis of MDD, ADHD, ASD, h/o hospitalizations, poor impulse control. Chronic mitigating factors include engaged in treatment, compliance with medications, motivation towards improvement, no h/o self harm, no access to weapons, good social supports.     On admission, patient was felt to be at acute risk of harm to self due to expression of SI, potentially suicidal behavior with his car, depressed mood, poor ADLs, increasingly withdrawn. Over the course of hospitalization, patient engaged appropriately in treatment and patient's symptoms improved. Mood lifted and stabilized, patient felt increasingly hopeful, sleep improved as did engagement in therapy and activities. Patient learned appropriate coping skills w/DBT group therapies. The patient became increasingly future/goal oriented, with increased command of coping skills.     Given these improvements the patient is no longer felt to be at an acutely elevated risk of harm to self and no longer requires inpatient level of care. Is stable for discharge and transition to outpatient treatment. The patient remains at chronic risk of harm to self and others due to chronic risk factors as above including impulsivity, which cannot be further mitigated by acute inpatient psychiatric hospitalization.     Patient will be discharged with the following DSM5 Diagnoses:  Major Depressive Disorder    Patient will be discharged on the following medications:  C/w home meds- Ritalin 10mg BID, Seroquel 25mg qhs, Prozac 60mg qhs. Patient had 7 day supply of Ritalin, 2 month supply of Prozac, 15 day supply of Seroquel at home. Since no medication changes were made, and patient had sufficient supply at home that will last enough until his appointment with outpatient psychiatrist, no medications sent.     Dates of Hospitalization: 22 - 22    On admission interview, patient presented with the following signs and symptoms: excessive sleep, depressed mood, poor ADLs, more withdrawn in bed most of the day, talking less, poor appetite for past 1 week. Patient reported to family that he had a SA by driving on the wrong side of the road. However, patient rescinded claim of SA, stated that he only said it to get out of trouble for driving on wrong side of road. Mother believes that suicidal statement was more of an impulsive comment rather than true SI. Patient denied depressive symptoms upon initial assessment, however he presented with constricted affect. Diagnostic impression on admission was Major Depressive Disorder.    Patient was continued on home medications of Prozac 60mg qhs, Ritalin 10mg BID, and Seroquel 25mg (which was started by outpatient psychiatrist after pt went into depressive state after  in Dec. 2021. Pt reported Seroquel improved mood greatly). No med changes were made, as patient progressively improved in depressive state, particularly with the assistance of skills learned group therapies.    Patient’s symptoms gradually improved over the course of the hospitalization. At the time of discharge, patient reports improvement in depressed mood, poor appetite, and ability to control impulses, and denies any active or passive suicidality. Patient is sleeping and eating well, with good energy and attention to ADLs.   There were no behavioral problems on the unit.  Patient did not become agitated and did not require emergent intramuscular medications or seclusion / restraints.  Patient did not self-harm on the unit.  Patient remained actively engaged in treatment.  Patient participated in individual, group, and milieu therapy.  Patient got along appropriately with staff and peers.  A family meeting was held at time of admission to provide psycho-education and to collaboratively treatment plan.  A second family meeting was held prior to discharge for safety planning and disposition planning.  Patient did not have any medical problems during hospitalization. There were no medical consults.     On day of discharge, the patient has improved significantly and no longer requires inpatient treatment and care. Patient denies all suicidal and aggressive ideation, intent and plan. Patient denies anxiety symptoms and panic attacks. Patient is not judged to be an acute danger to self or others at this time.    Risk Assessment:  The patient is at risk of chronic harm to self and others given history of expressing SI and engaging in potentially suicidal behavior, diagnosis of MDD, ADHD, ASD, h/o hospitalizations, poor impulse control. Chronic mitigating factors include engaged in treatment, compliance with medications, motivation towards improvement, no h/o self harm, no access to weapons, good social supports.     On admission, patient was felt to be at acute risk of harm to self due to expression of SI, potentially suicidal behavior with his car, depressed mood, poor ADLs, increasingly withdrawn. Over the course of hospitalization, patient engaged appropriately in treatment and patient's symptoms improved. Mood lifted and stabilized, patient felt increasingly hopeful, sleep improved as did engagement in therapy and activities. Patient learned appropriate coping skills w/DBT group therapies. The patient became increasingly future/goal oriented, with increased command of coping skills.     Given these improvements the patient is no longer felt to be at an acutely elevated risk of harm to self and no longer requires inpatient level of care. Is stable for discharge and transition to outpatient treatment. The patient remains at chronic risk of harm to self and others due to chronic risk factors as above including impulsivity, which cannot be further mitigated by acute inpatient psychiatric hospitalization.     Patient will be discharged with the following DSM5 Diagnoses:  Major Depressive Disorder    Patient will be discharged on the following medications:  C/w home meds- Ritalin 10mg BID, Seroquel 25mg qhs, Prozac 60mg qhs. Patient had 7 day supply of Ritalin, 2 month supply of Prozac, 15 day supply of Seroquel at home. Since no medication changes were made, and patient had sufficient supply at home that will last enough until his appointment with outpatient psychiatrist, no medications sent.     Dates of Hospitalization: 22 - 22    On admission interview, patient presented with the following signs and symptoms: excessive sleep, depressed mood, poor ADLs, more withdrawn in bed most of the day, talking less, poor appetite for past 1 week. Patient reported to family that he had a SA by driving on the wrong side of the road. However, patient rescinded claim of SA, stated that he only said it to get out of trouble for driving on wrong side of road. Mother believes that suicidal statement was more of an impulsive comment rather than true SI. Patient denied depressive symptoms upon initial assessment, however he presented with constricted affect. Diagnostic impression on admission was Major Depressive Disorder.    Patient was continued on home medications of Prozac 60mg qhs, Ritalin 10mg BID, and Seroquel 25mg (which was started by outpatient psychiatrist after pt went into depressive state after  in Dec. 2021. Pt reported Seroquel improved mood greatly). No med changes were made, as patient progressively improved in depressive state, particularly with the assistance of skills learned in group therapies.    Patient’s symptoms gradually improved over the course of the hospitalization. At the time of discharge, patient reports improvement in depressed mood, poor appetite, and ability to control impulses, and denies any active or passive suicidality. Patient is sleeping and eating well, with good energy and attention to ADLs.   There were no behavioral problems on the unit.  Patient did not become agitated and did not require emergent intramuscular medications or seclusion / restraints.  Patient did not self-harm on the unit.  Patient remained actively engaged in treatment.  Patient participated in individual, group, and milieu therapy.  Patient got along appropriately with staff and peers.  Family was contacted at time of admission to provide psycho-education and to collaboratively treatment plan.  Family was contacted prior to discharge for safety planning and disposition planning.  Patient did not have any medical problems during hospitalization. There were no medical consults.     On day of discharge, the patient has improved significantly and no longer requires inpatient treatment and care. Patient denies all suicidal and aggressive ideation, intent and plan. Patient denies anxiety symptoms and panic attacks. Patient is not judged to be an acute danger to self or others at this time.    Risk Assessment:  The patient is at risk of chronic harm to self and others given history of expressing SI and engaging in potentially suicidal behavior, diagnosis of MDD, ADHD, ASD, h/o hospitalizations, poor impulse control. Chronic mitigating factors include engaged in treatment, compliance with medications, motivation towards improvement, no h/o self harm, no access to weapons, good social supports.     On admission, patient was felt to be at acute risk of harm to self due to expression of SI, potentially suicidal behavior with his car, depressed mood, poor ADLs, increasingly withdrawn. Over the course of hospitalization, patient engaged appropriately in treatment and patient's symptoms improved. Mood lifted and stabilized, patient felt increasingly hopeful, sleep improved as did engagement in therapy and activities. Patient learned appropriate coping skills w/DBT group therapies. The patient became increasingly future/goal oriented, with increased command of coping skills.     Given these improvements the patient is no longer felt to be at an acutely elevated risk of harm to self and no longer requires inpatient level of care. Is stable for discharge and transition to outpatient treatment. The patient remains at chronic risk of harm to self and others due to chronic risk factors as above including impulsivity, which cannot be further mitigated by acute inpatient psychiatric hospitalization.     Patient will be discharged with the following DSM5 Diagnoses:  Major Depressive Disorder    Patient will be discharged on the following medications:  C/w home meds- Ritalin 10mg BID, Seroquel 25mg qhs, Prozac 60mg qhs. Patient had 7 day supply of Ritalin, 2 month supply of Prozac, 15 day supply of Seroquel at home. Since no medication changes were made, and patient had sufficient supply at home that will last enough until his appointment with outpatient psychiatrist, no medications sent.

## 2022-06-08 NOTE — BH INPATIENT PSYCHIATRY PROGRESS NOTE - NSBHCHARTREVIEWVS_PSY_A_CORE FT
Vital Signs Last 24 Hrs  T(C): 36.1 (06-08-22 @ 06:51), Max: 36.9 (06-07-22 @ 20:16)  T(F): 97 (06-08-22 @ 06:51), Max: 98.4 (06-07-22 @ 20:16)  HR: --  BP: --  BP(mean): --  RR: --  SpO2: --    Orthostatic VS  06-08-22 @ 06:51  Lying BP: --/-- HR: --  Sitting BP: 129/74 HR: 79  Standing BP: --/-- HR: --  Site: --  Mode: --  Orthostatic VS  06-07-22 @ 08:13  Lying BP: --/-- HR: --  Sitting BP: 128/66 HR: 77  Standing BP: --/-- HR: --  Site: --  Mode: --   Vital Signs Last 24 Hrs  T(C): 36.6 (06-09-22 @ 06:41), Max: 37.1 (06-08-22 @ 20:46)  T(F): 97.9 (06-09-22 @ 06:41), Max: 98.8 (06-08-22 @ 20:46)  HR: 78 (06-09-22 @ 06:41) (78 - 78)  BP: 138/91 (06-09-22 @ 06:41) (138/91 - 138/91)  BP(mean): --  RR: 16 (06-09-22 @ 06:41) (16 - 16)  SpO2: --    Orthostatic VS  06-08-22 @ 06:51  Lying BP: --/-- HR: --  Sitting BP: 129/74 HR: 79  Standing BP: --/-- HR: --  Site: --  Mode: --

## 2022-06-08 NOTE — BH INPATIENT PSYCHIATRY DISCHARGE NOTE - NSBHMETABOLIC_PSY_ALL_CORE_FT
BMI:   HbA1c: A1C with Estimated Average Glucose Result: 5.1 % (06-03-22 @ 08:10)    Glucose: POCT Blood Glucose.: 93 mg/dL (06-01-22 @ 17:07)    BP: 129/76 (06-06-22 @ 08:39) (129/76 - 129/76)  Lipid Panel: Date/Time: 06-03-22 @ 08:10  Cholesterol, Serum: 179  Direct LDL: --  HDL Cholesterol, Serum: 29  Total Cholesterol/HDL Ration Measurement: --  Triglycerides, Serum: 119

## 2022-06-08 NOTE — BH INPATIENT PSYCHIATRY PROGRESS NOTE - PRN MEDS
MEDICATIONS  (PRN):  diphenhydrAMINE 50 milliGRAM(s) Oral every 6 hours PRN agitation/eps ppx  diphenhydrAMINE Injectable 50 milliGRAM(s) IntraMuscular once PRN eps ppx/severe agitation  haloperidol     Tablet 5 milliGRAM(s) Oral every 6 hours PRN agitation  haloperidol    Injectable 5 milliGRAM(s) IntraMuscular once PRN severe agitation  hydrOXYzine hydrochloride 25 milliGRAM(s) Oral every 6 hours PRN anxiety  LORazepam     Tablet 2 milliGRAM(s) Oral every 6 hours PRN agitation  LORazepam   Injectable 2 milliGRAM(s) IntraMuscular once PRN severe agitation  melatonin. 3 milliGRAM(s) Oral at bedtime PRN Insomnia   MEDICATIONS  (PRN):  diphenhydrAMINE 50 milliGRAM(s) Oral every 6 hours PRN agitation/eps ppx  diphenhydrAMINE Injectable 50 milliGRAM(s) IntraMuscular once PRN eps ppx/severe agitation  haloperidol     Tablet 5 milliGRAM(s) Oral every 6 hours PRN agitation  haloperidol    Injectable 5 milliGRAM(s) IntraMuscular once PRN severe agitation  hydrOXYzine hydrochloride 25 milliGRAM(s) Oral every 6 hours PRN anxiety  LORazepam     Tablet 2 milliGRAM(s) Oral every 6 hours PRN agitation  LORazepam   Injectable 2 milliGRAM(s) IntraMuscular once PRN severe agitation  melatonin. 3 milliGRAM(s) Oral at bedtime PRN Insomnia  simethicone 80 milliGRAM(s) Chew once PRN Upset Stomach

## 2022-06-08 NOTE — BH INPATIENT PSYCHIATRY DISCHARGE NOTE - NSBHDCHANDOFFFT_PSY_ALL_CORE
Handoff provided to Dr. Kurtz at [phone] Handoff provided via phone to Dr. Kurtz at [phone] including information re: presentation, hospital course, and future treatment recommendations. Aware of how to reach Dr. Rosales on 1S at [phone] if questions emerge.
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
## 2022-06-08 NOTE — BH SAFETY PLAN - WARNING SIGN 1
BP better controlled. SBP ~160  - Continue Amlodipine 5mg daily  - hold Lasix and Lisinopril for now  - Monitor BP serially  Sodium restriction Acting more irritable - start ceftriaxone 1g IV today  - f/u urine cx sensitivities

## 2022-06-08 NOTE — BH INPATIENT PSYCHIATRY PROGRESS NOTE - NSBHMETABOLIC_PSY_ALL_CORE_FT
BMI:   HbA1c: A1C with Estimated Average Glucose Result: 5.1 % (06-03-22 @ 08:10)    Glucose: POCT Blood Glucose.: 93 mg/dL (06-01-22 @ 17:07)    BP: 129/76 (06-06-22 @ 08:39) (129/76 - 129/76)  Lipid Panel: Date/Time: 06-03-22 @ 08:10  Cholesterol, Serum: 179  Direct LDL: --  HDL Cholesterol, Serum: 29  Total Cholesterol/HDL Ration Measurement: --  Triglycerides, Serum: 119   BMI:   HbA1c: A1C with Estimated Average Glucose Result: 5.1 % (06-03-22 @ 08:10)    Glucose: POCT Blood Glucose.: 93 mg/dL (06-01-22 @ 17:07)    BP: 138/91 (06-09-22 @ 06:41) (138/91 - 138/91)  Lipid Panel: Date/Time: 06-03-22 @ 08:10  Cholesterol, Serum: 179  Direct LDL: --  HDL Cholesterol, Serum: 29  Total Cholesterol/HDL Ration Measurement: --  Triglycerides, Serum: 119

## 2022-06-08 NOTE — BH INPATIENT PSYCHIATRY DISCHARGE NOTE - NSDCMRMEDTOKEN_GEN_ALL_CORE_FT
FLUoxetine 20 mg oral capsule: 3 cap(s) orally once a day (at bedtime)  methylphenidate 10 mg oral tablet: 1 tab(s) orally 2 times a day  QUEtiapine 25 mg oral tablet: 1 tab(s) orally once a day (at bedtime)
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
## 2022-06-08 NOTE — BH PSYCHOLOGY - CLINICIAN PSYCHOTHERAPY NOTE - NSBHPSYCHOLGOALS_PSY_A_CORE
Decrease symptoms/Improve social/vocational/coping skills/Prevent relapse/Psychoeducation/Treatment compliance
Assessment/Psychoeducation/Treatment compliance
Decrease symptoms/Improve social/vocational/coping skills/Psychoeducation/Treatment compliance

## 2022-06-09 VITALS
HEART RATE: 78 BPM | RESPIRATION RATE: 16 BRPM | TEMPERATURE: 98 F | SYSTOLIC BLOOD PRESSURE: 138 MMHG | DIASTOLIC BLOOD PRESSURE: 91 MMHG

## 2022-06-09 PROCEDURE — 99238 HOSP IP/OBS DSCHRG MGMT 30/<: CPT

## 2022-06-09 RX ADMIN — Medication 10 MILLIGRAM(S): at 09:44

## 2022-06-09 RX ADMIN — Medication 10 MILLIGRAM(S): at 13:07

## 2022-06-09 NOTE — BH INPATIENT PSYCHIATRY PROGRESS NOTE - MODIFICATIONS
I have modified the resident's note, my edits/additions can be seen in finalized document above. 

## 2022-06-09 NOTE — BH INPATIENT PSYCHIATRY PROGRESS NOTE - CURRENT MEDICATION
MEDICATIONS  (STANDING):  FLUoxetine 60 milliGRAM(s) Oral at bedtime  methylphenidate 10 milliGRAM(s) Oral <User Schedule>  QUEtiapine 25 milliGRAM(s) Oral at bedtime    MEDICATIONS  (PRN):  diphenhydrAMINE 50 milliGRAM(s) Oral every 6 hours PRN agitation/eps ppx  diphenhydrAMINE Injectable 50 milliGRAM(s) IntraMuscular once PRN eps ppx/severe agitation  haloperidol     Tablet 5 milliGRAM(s) Oral every 6 hours PRN agitation  haloperidol    Injectable 5 milliGRAM(s) IntraMuscular once PRN severe agitation  hydrOXYzine hydrochloride 25 milliGRAM(s) Oral every 6 hours PRN anxiety  LORazepam     Tablet 2 milliGRAM(s) Oral every 6 hours PRN agitation  LORazepam   Injectable 2 milliGRAM(s) IntraMuscular once PRN severe agitation  melatonin. 3 milliGRAM(s) Oral at bedtime PRN Insomnia  simethicone 80 milliGRAM(s) Chew once PRN Upset Stomach

## 2022-06-09 NOTE — BH TREATMENT PLAN - NSTXDCOTHRINTERMD_PSY_ALL_CORE
Collaborate with SW to provide appropriate discharge planning upon stabilization
Collaborate with SW to provide appropriate discharge planning upon stabilization

## 2022-06-09 NOTE — BH INPATIENT PSYCHIATRY PROGRESS NOTE - NSTXSUICIDGOAL_PSY_ALL_CORE
Will identify and utilize 2 coping skills

## 2022-06-09 NOTE — BH INPATIENT PSYCHIATRY PROGRESS NOTE - NSBHCHARTREVIEWVS_PSY_A_CORE FT
Vital Signs Last 24 Hrs  T(C): 36.6 (06-09-22 @ 06:41), Max: 37.1 (06-08-22 @ 20:46)  T(F): 97.9 (06-09-22 @ 06:41), Max: 98.8 (06-08-22 @ 20:46)  HR: 78 (06-09-22 @ 06:41) (78 - 78)  BP: 138/91 (06-09-22 @ 06:41) (138/91 - 138/91)  BP(mean): --  RR: 16 (06-09-22 @ 06:41) (16 - 16)  SpO2: --    Orthostatic VS  06-08-22 @ 06:51  Lying BP: --/-- HR: --  Sitting BP: 129/74 HR: 79  Standing BP: --/-- HR: --  Site: --  Mode: --

## 2022-06-09 NOTE — BH TREATMENT PLAN - NSTXPATIENTPARTICIPATE_PSY_ALL_CORE
Patient participated in defining interventions/Patient participated in development of after care plan
Patient participated in identification of needs/problems/goals for treatment/Patient participated in defining interventions/Patient participated in development of after care plan

## 2022-06-09 NOTE — BH TREATMENT PLAN - NSTXDEPRESINTERRN_PSY_ALL_CORE
Monitor mood and behavior. Observe for signs of increased depressive symptoms. Provide staff support.

## 2022-06-09 NOTE — BH INPATIENT PSYCHIATRY PROGRESS NOTE - NSBHASSESSSUMMFT_PSY_ALL_CORE
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 Kurtz since 12yo, last hospitalized at [facility] in 2012, with one prior suicide attempt, no known hx of violence, history of arrest for marijuana possession, uses cannabis occasionally, no hx of withdrawals/DTs, no sig past medical hx, BIB mother/aunt/uncle referred by patient's psychiatrist due to recent behavior concerning for suicide attempt. Patient hospitalized to [facility] 1S for further stabilization.     Discharge Diagnosis: Major Depressive Disorder    Today, patient displays future-orientation, learned from mistakes of poor judgement calls, is planning to use skills learned in DBT group therapies in order to control impulses and emotions, as well as to be able to talk out his emotions with family members and health care providers. Patient denies SI, is stable to be discharge today.    Risk Assessment:   1. Patient to be discharged to home today with mother. Patient and family in agreement with discharge plan.  2. Continue psychiatric medications as follows: Ritalin 10mg BID, Seroquel 25mg qhs, Prozac 60mg qhs, 0 day supply of medication provided, as patient had sufficient   3. Aftercare plan: Dr. Kurtz 14-Jun-2022 14:45 Virtual [phone]  Delma Molina 17-Jun-2022 02:00 In person [phone]  4. Medical: N/A  5. Safety plan reviewed and emergency procedures discussed including crisis clinic, ED and use of 911 for acute safety concerns. Patient and family aware of how to contact 1S team.  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 Kurtz since 12yo, last hospitalized at [facility] in 2012, with one prior suicide attempt, no known hx of violence, history of arrest for marijuana possession, uses cannabis occasionally, no hx of withdrawals/DTs, no sig past medical hx, BIB mother/aunt/uncle referred by patient's psychiatrist due to recent behavior concerning for suicide attempt. Patient hospitalized to [facility] 1S for further stabilization.     Discharge Diagnosis: Major Depressive Disorder    Today, patient displays future-orientation, learned from mistakes of poor judgement calls, is planning to use skills learned in DBT group therapies in order to control impulses and emotions, as well as to be able to talk out his emotions with family members and health care providers. Patient denies SI, is stable to be discharge today.    Risk Assessment:     The patient is at risk of chronic harm to self and others given history of expressing SI, diagnosis of MDD, ADHD, h/o hospitalizations, poor impulse control. Chronic mitigating factors include engaged in treatment, compliance with medications, motivation towards improvement, no h/o self harm/SA, no access to weapons, good social supports.     On admission, patient was felt to be at acute risk of harm to self due to expression of SI, depressed mood, poor ADLs, increasingly withdrawn. Over the course of hospitalization, patient engaged appropriately in treatment and patient's symptoms improved. Mood lifted and stabilized, patient felt increasingly hopeful, sleep improved as did engagement in therapy and activities. Patient learned appropriate coping skills w/DBT group therapies. The patient became increasingly future/goal oriented, with increased command of coping skills.     Given these improvements the patient is no longer felt to be at an acutely elevated risk of harm to self and no longer requires inpatient level of care. Is stable for discharge and transition to outpatient treatment. The patient remains at chronic risk of harm to self and others due to chronic risk factors as above, which cannot be further mitigated by acute inpatient psychiatric hospitalization.     1. Patient to be discharged to home today with mother. Patient and family in agreement with discharge plan.  2. Continue psychiatric medications as follows: Ritalin 10mg BID, Seroquel 25mg qhs, Prozac 60mg qhs, 0 day supply of medication provided, as patient had sufficient supply  3. Aftercare plan: Dr. Kurtz 14-Jun-2022 14:45 Virtual [phone]  Delma Molina 17-Jun-2022 02:00 In person [phone]  4. Medical: N/A  5. Safety plan reviewed and emergency procedures discussed including crisis clinic, ED and use of 911 for acute safety concerns. Patient and family aware of how to contact 1S team.  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 Kurtz since 12yo, last hospitalized at [facility] in 2012, with one prior suicide attempt, no known hx of violence, history of arrest for marijuana possession, uses cannabis occasionally, no hx of withdrawals/DTs, no sig past medical hx, BIB mother/aunt/uncle referred by patient's psychiatrist due to recent behavior concerning for suicide attempt. Patient hospitalized to [facility] 1S for further stabilization.     Discharge Diagnosis: Major Depressive Disorder    Today, patient displays future-orientation, learned from mistakes of poor judgement calls, is planning to use skills learned in DBT group therapies in order to control impulses and emotions, as well as to be able to talk out his emotions with family members and health care providers. Patient denies SI, is stable to be discharge today.    Risk Assessment:     The patient is at risk of chronic harm to self and others given history of expressing SI and engaging in potentially suicidal behavior, diagnosis of MDD, ADHD, ASD, h/o hospitalizations, poor impulse control. Chronic mitigating factors include engaged in treatment, compliance with medications, motivation towards improvement, no h/o self harm, no access to weapons, good social supports.     On admission, patient was felt to be at acute risk of harm to self due to expression of SI, potentially suicidal behavior with his car, depressed mood, poor ADLs, increasingly withdrawn. Over the course of hospitalization, patient engaged appropriately in treatment and patient's symptoms improved. Mood lifted and stabilized, patient felt increasingly hopeful, sleep improved as did engagement in therapy and activities. Patient learned appropriate coping skills w/DBT group therapies. The patient became increasingly future/goal oriented, with increased command of coping skills.     Given these improvements the patient is no longer felt to be at an acutely elevated risk of harm to self and no longer requires inpatient level of care. Is stable for discharge and transition to outpatient treatment. The patient remains at chronic risk of harm to self and others due to chronic risk factors as above including impulsivity, which cannot be further mitigated by acute inpatient psychiatric hospitalization.     1. Patient to be discharged to home today with mother. Patient and family in agreement with discharge plan.  2. Continue psychiatric medications as follows: Ritalin 10mg BID, Seroquel 25mg qhs, Prozac 60mg qhs, 0 day supply of medication provided, as patient had sufficient supply  3. Aftercare plan: Dr. Kurtz 14-Jun-2022 14:45 Virtual [phone]  Delma Molina 17-Jun-2022 02:00 In person [phone]  4. Medical: N/A  5. Safety plan reviewed and emergency procedures discussed including crisis clinic, ED and use of 911 for acute safety concerns. Patient and family aware of how to contact 1S team.

## 2022-06-09 NOTE — BH INPATIENT PSYCHIATRY PROGRESS NOTE - PRN MEDS
MEDICATIONS  (PRN):  diphenhydrAMINE 50 milliGRAM(s) Oral every 6 hours PRN agitation/eps ppx  diphenhydrAMINE Injectable 50 milliGRAM(s) IntraMuscular once PRN eps ppx/severe agitation  haloperidol     Tablet 5 milliGRAM(s) Oral every 6 hours PRN agitation  haloperidol    Injectable 5 milliGRAM(s) IntraMuscular once PRN severe agitation  hydrOXYzine hydrochloride 25 milliGRAM(s) Oral every 6 hours PRN anxiety  LORazepam     Tablet 2 milliGRAM(s) Oral every 6 hours PRN agitation  LORazepam   Injectable 2 milliGRAM(s) IntraMuscular once PRN severe agitation  melatonin. 3 milliGRAM(s) Oral at bedtime PRN Insomnia  simethicone 80 milliGRAM(s) Chew once PRN Upset Stomach

## 2022-06-09 NOTE — BH TREATMENT PLAN - NSTXDCOTHRGOAL_PSY_ALL_CORE
Pt. will comply with treatment recommendations within seven days.
Pt. will comply with treatment recommendations within seven days.

## 2022-06-09 NOTE — BH INPATIENT PSYCHIATRY PROGRESS NOTE - NSTXDCOTHRGOAL_PSY_ALL_CORE
Pt. will comply with treatment recommendations within seven days.

## 2022-06-09 NOTE — BH INPATIENT PSYCHIATRY PROGRESS NOTE - NSBHFUPINTERVALHXFT_PSY_A_CORE
Chart reviewed. Case discussed with multidisciplinary team. No acute events overnight. No PRNs required/requested. Per sleep log, fair sleep overnight.    Patient states that he feels well, is excited to go home. He states that he is looking forward to working and seeing his cat. He states that he likes working because he wants to make money. Patient states that he is eating well and sleeping, feels somewhat tired this AM because he was watching basketball game late last night. Patient states that he learned to not drive on the wrong side of the road, and to tell someone if he is feeling unwell. He states that he learned to use strategies such as mindfulness and TIPPs. He said he performed safety plan, says he can talk to his mom, aunt, uncle, and cousins if not feeling well. He denies SI/HI, AVH. He asks if it is a good idea to not go back to work.

## 2022-06-09 NOTE — BH INPATIENT PSYCHIATRY PROGRESS NOTE - CASE SUMMARY
Today Nicholas continues to deny ben depressive symptoms including passive/active suicidal ideation. Attending groups, practicing skills. Demonstrates some increased ability to discuss triggers to current mood changes-namely missing his father with trigger of upcoming [employer] draft which they would always attend together. Given some improvement will hold off on adjusting dose of Prozac for now. Expand collateral from individual therapist Dr. Molina. Continue to encourage I/G/M therapy. 
Today Nicholas reports good mood, denies symptoms of acute depressive episode, is sleeping and eating well, and is denying passive/active S/I. Is able to discuss safety plan, cope ahead and DBT skills he can use. Cites family supports. Is tolerating psychiatric medications. As per detailed risk assessment above the patient is no longer at acutely elevated risk of harm to self and no longer requires inpatient hospitalization. Will d/c home with mother to follow up with long time psychiatrist and therapist. 
Today Nicholas continues to present as largely euthymic denying passive/active SI, future oriented, attending groups and discussing coping skills appropriately. Tolerating current medications. Plan for discharge tomorrow if patient maintains gains and develops adequate safety plan. 
Nicholas today is presenting as discharge focused and denying all depressive symptoms including S/I. Is however more engaged in discussion of coping skills, how to manage decisions related to loss of his father, with small increases in insight and treatment engagement.

## 2022-06-09 NOTE — BH INPATIENT PSYCHIATRY PROGRESS NOTE - NSTXDCOTHRINTERMD_PSY_ALL_CORE
Collaborate with SW to provide appropriate discharge planning upon stabilization

## 2022-06-09 NOTE — BH INPATIENT PSYCHIATRY PROGRESS NOTE - MSE UNSTRUCTURED FT
The patient appears stated age, fair hygiene and dressed appropriately.  The patient was calm, cooperative with the interview and maintained appropriate eye contact with appropriate relatedness. No psychomotor agitation or slowing noted, no abnormal movements. Steady gait observed. The patient's speech was fluent, normal in tone, rate, and volume.  The patient's mood is "good." Affect is euthymic, stable and appropriate.  Thought process is goal directed, future-oriented. Thought content negative for AVH, delusions, SI/HI. Cognition AAOx3. Insight is fair. Judgment is fair. Impulse control has been fair on the unit.
On exam today the patient is anxious but with fair eye contact.    Speech is clear and of normal rate.    Thought process: with no evidence of a disorder of thought process.    Thought content: with no evidence of psychotic beliefs.  Perception: Denies hallucinations.    Mood: Describes as "anxious and sad".  Affect: constricted.    Patient denies active suicidal ideation, intent and plan.    Patient denies active aggressive/homicidal ideation, intent or plan.   Patient is Alert and oriented in all spheres. Patient is cognitively grossly intact.   Insight and judgment are limited. Impulse control is intact at this time.    
The patient appears stated age, fair hygiene and dressed appropriately. The patient was calm, cooperative with the interview and maintained appropriate eye contact with appropriate relatedness. No psychomotor agitation or slowing noted, no abnormal movements. Steady gait observed. The patient's speech was fluent, normal in tone, rate, and volume.  The patient's mood is "good." Affect is euthymic, stable and appropriate.  Thought process is goal directed, future-oriented. Thought content negative for AVH, delusions, SI/HI. Cognition AAOx3. Insight is fair. Judgment is fair. Impulse control has been fair on the unit.
The patient appears stated age, fair hygiene and dressed appropriately.  The patient was calm, cooperative with the interview and maintained appropriate eye contact with appropriate relatedness. No psychomotor agitation or slowing noted, no abnormal movements. Steady gait observed. The patient's speech was fluent, normal in tone, rate, and volume.  The patient's mood is "good." Affect is euthymic, stable and appropriate.  Thought process is goal directed, future-oriented. Thought content negative for AVH, delusions, SI/HI. Cognition AAOx3. Insight is fair. Judgment is fair. Impulse control has been fair on the unit.
The patient appears stated age, fair hygiene and dressed appropriately.  The patient was calm, cooperative with the interview and maintained appropriate eye contact with appropriate relatedness. No psychomotor agitation or retardation noted, no abnormal movements. Steady gait observed. The patient's speech was fluent, normal in tone, rate, and volume.  The patient's mood is "good." Affect is euthymic, stable and appropriate.  Thought process is goal directed. Thought content negative for AVH, delusions, SI/HI. Cognition AAOx3. Insight is fair. Judgment is fair. Impulse control has been fair on the unit.
On exam today the patient is depressed and anxious.    Speech is clear and of normal rate.    Thought process: with no evidence of a disorder of thought process.    Thought content: with no evidence of psychotic beliefs.  Perception: Denies hallucinations.    Mood: Describes as "the same, anxious and sad".  Affect: constricted.    Patient denies hopelessness and active suicidal ideation, intent and plan.    Patient denies active aggressive/homicidal ideation, intent or plan.   Patient is Alert and oriented in all spheres. Patient is cognitively grossly intact.   Insight and judgment are limited. Impulse control is intact at this time.

## 2022-06-09 NOTE — BH INPATIENT PSYCHIATRY PROGRESS NOTE - NSBHMETABOLIC_PSY_ALL_CORE_FT
BMI:   HbA1c: A1C with Estimated Average Glucose Result: 5.1 % (06-03-22 @ 08:10)    Glucose: POCT Blood Glucose.: 93 mg/dL (06-01-22 @ 17:07)    BP: 138/91 (06-09-22 @ 06:41) (138/91 - 138/91)  Lipid Panel: Date/Time: 06-03-22 @ 08:10  Cholesterol, Serum: 179  Direct LDL: --  HDL Cholesterol, Serum: 29  Total Cholesterol/HDL Ration Measurement: --  Triglycerides, Serum: 119

## 2022-06-09 NOTE — BH TREATMENT PLAN - NSTXDCOTHRINTERSW_PSY_ALL_CORE
SW will provide psychoeducation, support and discharge planning.  Collateral supports to be contacted accordingly.
SW will provide psychoeducation, support and discharge planning.  Collateral supports to be contacted accordingly.

## 2022-06-09 NOTE — BH INPATIENT PSYCHIATRY PROGRESS NOTE - NSBHINPTBILLING_PSY_ALL_CORE
06538 - Inpatient Low Complexity
11258 - Inpatient Low Complexity
03941 - Inpatient Moderate Complexity
01946 - Hospital Discharge Day Management; 30 min or less
48133 - Inpatient Moderate Complexity
45972 - Inpatient Moderate Complexity

## 2022-06-09 NOTE — BH INPATIENT PSYCHIATRY PROGRESS NOTE - NSBHATTESTSEENBY_PSY_A_CORE
attending Psychiatrist without NP/Trainee
attending Psychiatrist without NP/Trainee
Attending Psychiatrist supervising NP/Trainee, meeting pt...

## 2022-06-09 NOTE — BH INPATIENT PSYCHIATRY PROGRESS NOTE - NSBHCONSBHPROVDETAILS_PSY_A_CORE  FT
Spoke with Dr. Kurtz at [phone]
Spoke with Dr. Kurtz at [phone]
Spoke with Dr. Kurtz at [phone]
Spoke with Dr. Kurtz at [phone]
Spoke with Dr. Kurtz at [phone]
Spoke with Dr. Kurtz at [phone]

## 2022-06-09 NOTE — BH INPATIENT PSYCHIATRY PROGRESS NOTE - NSTXSUICIDINTERMD_PSY_ALL_CORE
Seroquel, Prozac for depressive symptoms

## 2022-06-09 NOTE — BH TREATMENT PLAN - NSTXDEPRESINTERPR_PSY_ALL_CORE
Psychiatric rehabilitation staff recommends for patient to continue to explore, utilize, and strenghten effective and healthy coping skills for better symptom management and sustain recovery.

## 2022-06-09 NOTE — BH INPATIENT PSYCHIATRY PROGRESS NOTE - NSTXDCOTHRDATETRGT_PSY_ALL_CORE
10-Evaristo-2022

## 2022-07-02 ENCOUNTER — NON-APPOINTMENT (OUTPATIENT)
Age: 27
End: 2022-07-02

## 2022-11-16 NOTE — BH INPATIENT PSYCHIATRY PROGRESS NOTE - PRN MEDS
MEDICATIONS  (PRN):  diphenhydrAMINE 50 milliGRAM(s) Oral every 6 hours PRN agitation/eps ppx  diphenhydrAMINE Injectable 50 milliGRAM(s) IntraMuscular once PRN eps ppx/severe agitation  haloperidol     Tablet 5 milliGRAM(s) Oral every 6 hours PRN agitation  haloperidol    Injectable 5 milliGRAM(s) IntraMuscular once PRN severe agitation  hydrOXYzine hydrochloride 25 milliGRAM(s) Oral every 6 hours PRN anxiety  LORazepam     Tablet 2 milliGRAM(s) Oral every 6 hours PRN agitation  LORazepam   Injectable 2 milliGRAM(s) IntraMuscular once PRN severe agitation  melatonin. 3 milliGRAM(s) Oral at bedtime PRN Insomnia   Home

## 2022-11-30 NOTE — BH PSYCHOLOGY - GROUP THERAPY NOTE - NSBHPSYCHOLRESPONSE_PSY_A_CORE
Accepted support
Accepted support
Coping skills acquired/Accepted support
[Annual] : an annual visit.
